# Patient Record
Sex: MALE | Race: WHITE | NOT HISPANIC OR LATINO | Employment: FULL TIME | URBAN - METROPOLITAN AREA
[De-identification: names, ages, dates, MRNs, and addresses within clinical notes are randomized per-mention and may not be internally consistent; named-entity substitution may affect disease eponyms.]

---

## 2020-11-14 ENCOUNTER — HOSPITAL ENCOUNTER (INPATIENT)
Facility: CLINIC | Age: 20
LOS: 2 days | Discharge: HOME | DRG: 385 | End: 2020-11-16
Attending: EMERGENCY MEDICINE | Admitting: INTERNAL MEDICINE
Payer: COMMERCIAL

## 2020-11-14 ENCOUNTER — APPOINTMENT (OUTPATIENT)
Dept: CT IMAGING | Facility: CLINIC | Age: 20
DRG: 385 | End: 2020-11-14
Attending: EMERGENCY MEDICINE
Payer: COMMERCIAL

## 2020-11-14 DIAGNOSIS — D50.9 IRON DEFICIENCY ANEMIA, UNSPECIFIED IRON DEFICIENCY ANEMIA TYPE: ICD-10-CM

## 2020-11-14 DIAGNOSIS — K52.9 ILEOCOLITIS: ICD-10-CM

## 2020-11-14 DIAGNOSIS — K50.814 CROHN'S DISEASE OF BOTH SMALL AND LARGE INTESTINE WITH ABSCESS (H): Primary | ICD-10-CM

## 2020-11-14 LAB
ALBUMIN SERPL-MCNC: 2.8 G/DL (ref 3.4–5)
ALBUMIN UR-MCNC: 20 MG/DL
ALP SERPL-CCNC: 68 U/L (ref 40–150)
ALT SERPL W P-5'-P-CCNC: 14 U/L (ref 0–70)
ANION GAP SERPL CALCULATED.3IONS-SCNC: 5 MMOL/L (ref 3–14)
APPEARANCE UR: CLEAR
AST SERPL W P-5'-P-CCNC: 12 U/L (ref 0–45)
BASOPHILS # BLD AUTO: 0 10E9/L (ref 0–0.2)
BASOPHILS NFR BLD AUTO: 0.2 %
BILIRUB SERPL-MCNC: 0.4 MG/DL (ref 0.2–1.3)
BILIRUB UR QL STRIP: NEGATIVE
BUN SERPL-MCNC: 11 MG/DL (ref 7–30)
CALCIUM SERPL-MCNC: 8.4 MG/DL (ref 8.5–10.1)
CHLORIDE SERPL-SCNC: 108 MMOL/L (ref 94–109)
CO2 SERPL-SCNC: 26 MMOL/L (ref 20–32)
COLOR UR AUTO: YELLOW
CREAT SERPL-MCNC: 1.12 MG/DL (ref 0.66–1.25)
CRP SERPL-MCNC: 120 MG/L (ref 0–8)
DIFFERENTIAL METHOD BLD: ABNORMAL
EOSINOPHIL # BLD AUTO: 0.1 10E9/L (ref 0–0.7)
EOSINOPHIL NFR BLD AUTO: 1 %
ERYTHROCYTE [DISTWIDTH] IN BLOOD BY AUTOMATED COUNT: 15.4 % (ref 10–15)
ERYTHROCYTE [SEDIMENTATION RATE] IN BLOOD BY WESTERGREN METHOD: 42 MM/H (ref 0–15)
FERRITIN SERPL-MCNC: 42 NG/ML (ref 26–388)
GFR SERPL CREATININE-BSD FRML MDRD: >90 ML/MIN/{1.73_M2}
GLUCOSE SERPL-MCNC: 79 MG/DL (ref 70–99)
GLUCOSE UR STRIP-MCNC: NEGATIVE MG/DL
HCT VFR BLD AUTO: 35.4 % (ref 40–53)
HGB BLD-MCNC: 10.2 G/DL (ref 13.3–17.7)
HGB UR QL STRIP: NEGATIVE
IMM GRANULOCYTES # BLD: 0 10E9/L (ref 0–0.4)
IMM GRANULOCYTES NFR BLD: 0.3 %
IRON SATN MFR SERPL: 2 % (ref 15–46)
IRON SERPL-MCNC: 6 UG/DL (ref 35–180)
KETONES UR STRIP-MCNC: NEGATIVE MG/DL
LABORATORY COMMENT REPORT: NORMAL
LACTATE BLD-SCNC: 0.6 MMOL/L (ref 0.7–2)
LEUKOCYTE ESTERASE UR QL STRIP: NEGATIVE
LIPASE SERPL-CCNC: 137 U/L (ref 73–393)
LYMPHOCYTES # BLD AUTO: 1 10E9/L (ref 0.8–5.3)
LYMPHOCYTES NFR BLD AUTO: 7.9 %
MCH RBC QN AUTO: 21.1 PG (ref 26.5–33)
MCHC RBC AUTO-ENTMCNC: 28.8 G/DL (ref 31.5–36.5)
MCV RBC AUTO: 73 FL (ref 78–100)
MONOCYTES # BLD AUTO: 0.9 10E9/L (ref 0–1.3)
MONOCYTES NFR BLD AUTO: 7.5 %
MUCOUS THREADS #/AREA URNS LPF: PRESENT /LPF
NEUTROPHILS # BLD AUTO: 10.4 10E9/L (ref 1.6–8.3)
NEUTROPHILS NFR BLD AUTO: 83.1 %
NITRATE UR QL: NEGATIVE
NRBC # BLD AUTO: 0 10*3/UL
NRBC BLD AUTO-RTO: 0 /100
PH UR STRIP: 6 PH (ref 5–7)
PLATELET # BLD AUTO: 554 10E9/L (ref 150–450)
POTASSIUM SERPL-SCNC: 3.8 MMOL/L (ref 3.4–5.3)
PROCALCITONIN SERPL-MCNC: <0.05 NG/ML
PROT SERPL-MCNC: 7.5 G/DL (ref 6.8–8.8)
RBC # BLD AUTO: 4.83 10E12/L (ref 4.4–5.9)
RBC #/AREA URNS AUTO: <1 /HPF (ref 0–2)
SARS-COV-2 RNA SPEC QL NAA+PROBE: NEGATIVE
SARS-COV-2 RNA SPEC QL NAA+PROBE: NORMAL
SODIUM SERPL-SCNC: 139 MMOL/L (ref 133–144)
SOURCE: ABNORMAL
SP GR UR STRIP: 1.03 (ref 1–1.03)
SPECIMEN SOURCE: NORMAL
SPECIMEN SOURCE: NORMAL
TIBC SERPL-MCNC: 275 UG/DL (ref 240–430)
UROBILINOGEN UR STRIP-MCNC: NORMAL MG/DL (ref 0–2)
WBC # BLD AUTO: 12.5 10E9/L (ref 4–11)
WBC #/AREA URNS AUTO: 1 /HPF (ref 0–5)

## 2020-11-14 PROCEDURE — 82728 ASSAY OF FERRITIN: CPT | Performed by: INTERNAL MEDICINE

## 2020-11-14 PROCEDURE — 96375 TX/PRO/DX INJ NEW DRUG ADDON: CPT

## 2020-11-14 PROCEDURE — 96365 THER/PROPH/DIAG IV INF INIT: CPT | Mod: 59

## 2020-11-14 PROCEDURE — 250N000011 HC RX IP 250 OP 636: Performed by: EMERGENCY MEDICINE

## 2020-11-14 PROCEDURE — 83605 ASSAY OF LACTIC ACID: CPT | Performed by: EMERGENCY MEDICINE

## 2020-11-14 PROCEDURE — 87340 HEPATITIS B SURFACE AG IA: CPT | Performed by: INTERNAL MEDICINE

## 2020-11-14 PROCEDURE — 86481 TB AG RESPONSE T-CELL SUSP: CPT | Performed by: INTERNAL MEDICINE

## 2020-11-14 PROCEDURE — 99223 1ST HOSP IP/OBS HIGH 75: CPT | Mod: AI | Performed by: INTERNAL MEDICINE

## 2020-11-14 PROCEDURE — 120N000004 HC R&B MS OVERFLOW

## 2020-11-14 PROCEDURE — 83540 ASSAY OF IRON: CPT | Performed by: EMERGENCY MEDICINE

## 2020-11-14 PROCEDURE — 36415 COLL VENOUS BLD VENIPUNCTURE: CPT | Performed by: EMERGENCY MEDICINE

## 2020-11-14 PROCEDURE — 81001 URINALYSIS AUTO W/SCOPE: CPT | Performed by: EMERGENCY MEDICINE

## 2020-11-14 PROCEDURE — 83550 IRON BINDING TEST: CPT | Performed by: EMERGENCY MEDICINE

## 2020-11-14 PROCEDURE — 258N000003 HC RX IP 258 OP 636: Performed by: INTERNAL MEDICINE

## 2020-11-14 PROCEDURE — C9803 HOPD COVID-19 SPEC COLLECT: HCPCS

## 2020-11-14 PROCEDURE — 74177 CT ABD & PELVIS W/CONTRAST: CPT

## 2020-11-14 PROCEDURE — 87506 IADNA-DNA/RNA PROBE TQ 6-11: CPT | Performed by: INTERNAL MEDICINE

## 2020-11-14 PROCEDURE — 85652 RBC SED RATE AUTOMATED: CPT | Performed by: EMERGENCY MEDICINE

## 2020-11-14 PROCEDURE — 85025 COMPLETE CBC W/AUTO DIFF WBC: CPT | Performed by: EMERGENCY MEDICINE

## 2020-11-14 PROCEDURE — 258N000003 HC RX IP 258 OP 636: Performed by: EMERGENCY MEDICINE

## 2020-11-14 PROCEDURE — 80053 COMPREHEN METABOLIC PANEL: CPT | Performed by: EMERGENCY MEDICINE

## 2020-11-14 PROCEDURE — 86140 C-REACTIVE PROTEIN: CPT | Performed by: EMERGENCY MEDICINE

## 2020-11-14 PROCEDURE — 99285 EMERGENCY DEPT VISIT HI MDM: CPT | Mod: 25

## 2020-11-14 PROCEDURE — U0003 INFECTIOUS AGENT DETECTION BY NUCLEIC ACID (DNA OR RNA); SEVERE ACUTE RESPIRATORY SYNDROME CORONAVIRUS 2 (SARS-COV-2) (CORONAVIRUS DISEASE [COVID-19]), AMPLIFIED PROBE TECHNIQUE, MAKING USE OF HIGH THROUGHPUT TECHNOLOGIES AS DESCRIBED BY CMS-2020-01-R: HCPCS | Performed by: EMERGENCY MEDICINE

## 2020-11-14 PROCEDURE — 87040 BLOOD CULTURE FOR BACTERIA: CPT | Performed by: EMERGENCY MEDICINE

## 2020-11-14 PROCEDURE — 84145 PROCALCITONIN (PCT): CPT | Performed by: EMERGENCY MEDICINE

## 2020-11-14 PROCEDURE — 83690 ASSAY OF LIPASE: CPT | Performed by: EMERGENCY MEDICINE

## 2020-11-14 PROCEDURE — 250N000011 HC RX IP 250 OP 636: Performed by: INTERNAL MEDICINE

## 2020-11-14 PROCEDURE — 96361 HYDRATE IV INFUSION ADD-ON: CPT

## 2020-11-14 RX ORDER — KETOROLAC TROMETHAMINE 15 MG/ML
15 INJECTION, SOLUTION INTRAMUSCULAR; INTRAVENOUS EVERY 6 HOURS PRN
Status: DISCONTINUED | OUTPATIENT
Start: 2020-11-14 | End: 2020-11-14

## 2020-11-14 RX ORDER — IOPAMIDOL 755 MG/ML
68 INJECTION, SOLUTION INTRAVASCULAR ONCE
Status: COMPLETED | OUTPATIENT
Start: 2020-11-14 | End: 2020-11-14

## 2020-11-14 RX ORDER — ACETAMINOPHEN 325 MG/1
650 TABLET ORAL EVERY 4 HOURS PRN
Status: DISCONTINUED | OUTPATIENT
Start: 2020-11-14 | End: 2020-11-16 | Stop reason: HOSPADM

## 2020-11-14 RX ORDER — HYDROMORPHONE HYDROCHLORIDE 1 MG/ML
0.3 INJECTION, SOLUTION INTRAMUSCULAR; INTRAVENOUS; SUBCUTANEOUS
Status: DISCONTINUED | OUTPATIENT
Start: 2020-11-14 | End: 2020-11-16 | Stop reason: HOSPADM

## 2020-11-14 RX ORDER — SODIUM CHLORIDE, SODIUM LACTATE, POTASSIUM CHLORIDE, CALCIUM CHLORIDE 600; 310; 30; 20 MG/100ML; MG/100ML; MG/100ML; MG/100ML
INJECTION, SOLUTION INTRAVENOUS CONTINUOUS
Status: DISCONTINUED | OUTPATIENT
Start: 2020-11-14 | End: 2020-11-15

## 2020-11-14 RX ORDER — LIDOCAINE 40 MG/G
CREAM TOPICAL
Status: DISCONTINUED | OUTPATIENT
Start: 2020-11-14 | End: 2020-11-16 | Stop reason: HOSPADM

## 2020-11-14 RX ORDER — NALOXONE HYDROCHLORIDE 0.4 MG/ML
.1-.4 INJECTION, SOLUTION INTRAMUSCULAR; INTRAVENOUS; SUBCUTANEOUS
Status: DISCONTINUED | OUTPATIENT
Start: 2020-11-14 | End: 2020-11-16 | Stop reason: HOSPADM

## 2020-11-14 RX ORDER — KETOROLAC TROMETHAMINE 15 MG/ML
10 INJECTION, SOLUTION INTRAMUSCULAR; INTRAVENOUS ONCE
Status: COMPLETED | OUTPATIENT
Start: 2020-11-14 | End: 2020-11-14

## 2020-11-14 RX ORDER — OXYCODONE HYDROCHLORIDE 5 MG/1
5 TABLET ORAL EVERY 4 HOURS PRN
Status: DISCONTINUED | OUTPATIENT
Start: 2020-11-14 | End: 2020-11-16 | Stop reason: HOSPADM

## 2020-11-14 RX ORDER — ONDANSETRON 2 MG/ML
4 INJECTION INTRAMUSCULAR; INTRAVENOUS EVERY 6 HOURS PRN
Status: DISCONTINUED | OUTPATIENT
Start: 2020-11-14 | End: 2020-11-16 | Stop reason: HOSPADM

## 2020-11-14 RX ORDER — METHYLPREDNISOLONE SODIUM SUCCINATE 125 MG/2ML
125 INJECTION, POWDER, LYOPHILIZED, FOR SOLUTION INTRAMUSCULAR; INTRAVENOUS ONCE
Status: COMPLETED | OUTPATIENT
Start: 2020-11-14 | End: 2020-11-14

## 2020-11-14 RX ORDER — ONDANSETRON 4 MG/1
4 TABLET, ORALLY DISINTEGRATING ORAL EVERY 6 HOURS PRN
Status: DISCONTINUED | OUTPATIENT
Start: 2020-11-14 | End: 2020-11-16 | Stop reason: HOSPADM

## 2020-11-14 RX ORDER — METHYLPREDNISOLONE SODIUM SUCCINATE 40 MG/ML
20 INJECTION, POWDER, LYOPHILIZED, FOR SOLUTION INTRAMUSCULAR; INTRAVENOUS EVERY 12 HOURS
Status: DISCONTINUED | OUTPATIENT
Start: 2020-11-14 | End: 2020-11-16 | Stop reason: HOSPADM

## 2020-11-14 RX ADMIN — IOPAMIDOL 68 ML: 755 INJECTION, SOLUTION INTRAVENOUS at 10:52

## 2020-11-14 RX ADMIN — TAZOBACTAM SODIUM AND PIPERACILLIN SODIUM 3.38 G: 375; 3 INJECTION, SOLUTION INTRAVENOUS at 20:20

## 2020-11-14 RX ADMIN — SODIUM CHLORIDE, POTASSIUM CHLORIDE, SODIUM LACTATE AND CALCIUM CHLORIDE 1000 ML: 600; 310; 30; 20 INJECTION, SOLUTION INTRAVENOUS at 10:36

## 2020-11-14 RX ADMIN — METHYLPREDNISOLONE SODIUM SUCCINATE 125 MG: 125 INJECTION, POWDER, FOR SOLUTION INTRAMUSCULAR; INTRAVENOUS at 13:27

## 2020-11-14 RX ADMIN — TAZOBACTAM SODIUM AND PIPERACILLIN SODIUM 4.5 G: 500; 4 INJECTION, SOLUTION INTRAVENOUS at 14:00

## 2020-11-14 RX ADMIN — SODIUM CHLORIDE, POTASSIUM CHLORIDE, SODIUM LACTATE AND CALCIUM CHLORIDE 1000 ML: 600; 310; 30; 20 INJECTION, SOLUTION INTRAVENOUS at 12:57

## 2020-11-14 RX ADMIN — METHYLPREDNISOLONE SODIUM SUCCINATE 20 MG: 40 INJECTION, POWDER, FOR SOLUTION INTRAMUSCULAR; INTRAVENOUS at 20:18

## 2020-11-14 RX ADMIN — SODIUM CHLORIDE, POTASSIUM CHLORIDE, SODIUM LACTATE AND CALCIUM CHLORIDE: 600; 310; 30; 20 INJECTION, SOLUTION INTRAVENOUS at 17:02

## 2020-11-14 RX ADMIN — KETOROLAC TROMETHAMINE 10 MG: 15 INJECTION, SOLUTION INTRAMUSCULAR; INTRAVENOUS at 10:36

## 2020-11-14 ASSESSMENT — ENCOUNTER SYMPTOMS
DYSURIA: 1
BLOOD IN STOOL: 1
VOMITING: 0
ABDOMINAL PAIN: 1
SORE THROAT: 0
FREQUENCY: 0
FEVER: 0

## 2020-11-14 ASSESSMENT — MIFFLIN-ST. JEOR
SCORE: 1634.49
SCORE: 1619.06

## 2020-11-14 NOTE — ED NOTES
[facility]  ED Nurse Handoff Report    Romina Dave is a 20 year old male   ED Chief complaint: Abdominal Pain  . ED Diagnosis:   Final diagnoses:   Ileocolitis   Iron deficiency anemia, unspecified iron deficiency anemia type     Allergies: No Known Allergies    Code Status: Full Code  Activity level - Baseline/Home:  Independent. Activity Level - Current:   Independent. Lift room needed: No. Bariatric: No   Needed: No   Isolation: No. Infection: Not Applicable.     Vital Signs:   Vitals:    11/14/20 1200 11/14/20 1215 11/14/20 1230 11/14/20 1245   BP: 113/70 109/68 110/66 113/65   Pulse: 99 95 98 106   Resp:       Temp:       TempSrc:       SpO2: 100% 100% 100% 100%   Weight:       Height:           Cardiac Rhythm:  ,      Pain level: 0-10 Pain Scale: 0  Patient confused: No. Patient Falls Risk: Yes.   Elimination Status: .   Patient Report - Initial Complaint: abdominal pain.     Focused Assessment: Romina Dave is a 20 year old otherwise healthy male with no history of abdominal surgeries who presents alone for evaluation of ongoing low bilateral abdominal pain for the last two months, worsening this morning at 0500. Patient reports he has been experiencing pain across his abdomen since September and notes the pain is typically alleviated when he passes gas, but exacerbated with passing stool. Patient reports this morning around 0500, he was laying down and had acute onset of pain. Patient reports that he went to go void and defecate as he had the urge to, but experienced dysuria and pain with defecation. Patient states the pain persisted longer than baseline, thus prompted his presentation.      Here, patient reports he has not been to a doctor's office in more than 8 years. Patient states that he has noticed some bright red blood in his stool, but states this happens whenever he strains and this resolved if he drinks lots of water. He has not taken any interventions for his pain,  "including Tylenol or ibuprofen. Patient denies any sore throat, fever, urinary frequency, vomiting, testicular pain or swelling. Of note, patient reports he recently \"discovered that he is lactose intolerant on my own.\"   Tests Performed: ct, labs  Abnormal Results:   Abd/pelvis CT,  IV  contrast only TRAUMA / AAA   Final Result   IMPRESSION:    1.  Marked wall thickening, mural hyperenhancement and inflammatory   changes involving about 25 cm of the distal and terminal ileum and   inflammatory changes involving the sigmoid colon most suspicious for   inflammatory bowel disease such as Crohn's ileocolitis.   2.  Likely small abscess in the pelvis between small bowel loops and   the sigmoid colon. This is less likely an abnormal collapsed bowel   loop and oral contrast could be administered for confirmation if   clinically desired. Percutaneous aspiration or catheter placement into   this abscess would be difficult due to presence of multiple adjacent   inflamed bowel loops.      SYLVIA JACOB MD        Labs Ordered and Resulted from Time of ED Arrival Up to the Time of Departure from the ED   ROUTINE UA WITH MICROSCOPIC - Abnormal; Notable for the following components:       Result Value    Protein Albumin Urine 20 (*)     Mucous Urine Present (*)     All other components within normal limits   CBC WITH PLATELETS DIFFERENTIAL - Abnormal; Notable for the following components:    WBC 12.5 (*)     Hemoglobin 10.2 (*)     Hematocrit 35.4 (*)     MCV 73 (*)     MCH 21.1 (*)     MCHC 28.8 (*)     RDW 15.4 (*)     Platelet Count 554 (*)     Absolute Neutrophil 10.4 (*)     All other components within normal limits   COMPREHENSIVE METABOLIC PANEL - Abnormal; Notable for the following components:    Calcium 8.4 (*)     Albumin 2.8 (*)     All other components within normal limits   IRON AND IRON BINDING CAPACITY - Abnormal; Notable for the following components:    Iron 6 (*)     Iron Saturation Index 2 (*)     All other " components within normal limits   CRP INFLAMMATION - Abnormal; Notable for the following components:    CRP Inflammation 120.0 (*)     All other components within normal limits   ERYTHROCYTE SEDIMENTATION RATE AUTO - Abnormal; Notable for the following components:    Sed Rate 42 (*)     All other components within normal limits   LACTIC ACID WHOLE BLOOD - Abnormal; Notable for the following components:    Lactic Acid 0.6 (*)     All other components within normal limits   LIPASE   PROCALCITONIN   COVID-19 VIRUS (CORONAVIRUS) BY PCR   BLOOD CULTURE   BLOOD CULTURE     Treatments provided: see mar  Family Comments: no visitors  OBS brochure/video discussed/provided to patient:  No  ED Medications:   Medications   lactated ringers BOLUS 1,000 mL (1,000 mLs Intravenous New Bag 11/14/20 1257)   ketorolac (TORADOL) injection 10 mg (10 mg Intravenous Given 11/14/20 1036)   lactated ringers BOLUS 1,000 mL (0 mLs Intravenous Stopped 11/14/20 1257)   iopamidol (ISOVUE-370) solution 68 mL (68 mLs Intravenous Given 11/14/20 1052)   sodium chloride (PF) 0.9% PF flush 57 mL (57 mLs Intravenous Given 11/14/20 1052)   methylPREDNISolone sodium succinate (solu-MEDROL) injection 125 mg (125 mg Intravenous Given 11/14/20 1327)     Drips infusing:  No  For the majority of the shift, the patient's behavior Green. Interventions performed were na.    Sepsis treatment initiated: No     Patient tested for COVID 19 prior to admission: YES    ED Nurse Name/Phone Number: Cindy Restrepo RN,   1:40 PM    RECEIVING UNIT ED HANDOFF REVIEW    Above ED Nurse Handoff Report was reviewed: Yes  Reviewed by: ORLANDO HERRERA RN on November 14, 2020 at 3:56 PM

## 2020-11-14 NOTE — CONSULTS
Consult Date:  11/14/2020      GASTROINTESTINAL CONSULTATION      REQUESTING PHYSICIAN:  Benjy Nieves M.D.      REASON FOR CONSULTATION:  We are asked to see Mr. Dave by Dr. Nieves for further evaluation of abnormal CT scan and abdominal pain.      HISTORY OF PRESENT ILLNESS:  The patient is a 20-year-old man who states he has been having some lower abdominal pain ongoing for about 2 months.  He describes pain with attempted urination or stooling.  Bowel movements are about once a day.  He does note intermittent rectal bleeding since around 2014, but has not had that worked up in the past.  He denies any fever or chills.  States there has been no change in his weight, although he is somewhat underweight, 5 feet 11, 135 pounds and despite working out he has been unable to gain.      MEDICATIONS:  He takes no medications.      FAMILY HISTORY:  Essentially unknown.      SOCIAL HISTORY:  Single, works as a .      REVIEW OF SYSTEMS:  He is a nonsmoker.  Did have some self-limited hematuria 2 years ago, apparently not worked up.  Remainder of 10-point review of systems is negative.      PHYSICAL EXAMINATION:   GENERAL:  Well-developed, well-nourished man in no acute distress.   VITAL SIGNS:  Temperature is 97.8, pulse of 98, blood pressure 115/70, respirations 18.   HEENT:  Pupils round, reactive to light.  Sclerae are anicteric.  Pharynx not examined due to mask placement.   NECK:  Supple, no adenopathy or thyromegaly.   COR:  Normal S1, S2.   CHEST:  Clear to percussion and auscultation.   ABDOMEN:  Notable for minimal left lower quadrant tenderness to deep palpation.  Bowel sounds are active.  There is no hepatomegaly, no splenomegaly.  No significant right lower quadrant tenderness.   EXTREMITIES:  Without cyanosis, clubbing or edema.   SKIN:  Unremarkable.  Hand  is 5/5 bilaterally.      LABORATORY DATA:  Electrolytes are normal.  Creatinine 1.12.  Liver function tests are normal.  He has a  CRP of 120.  Iron of 6, iron saturation of 2.  Lipase is normal.  White count of 12.5, hemoglobin 10.2, MCV is 73, platelets 554,000.  Blood cultures pending.  CT scan of the abdomen demonstrates approximately a 25 cm segment of distal and terminal ileum with marked circumferential wall thickening, mural enhancement, mild dilatation and inflammatory changes as well as some possible inflammatory change in the sigmoid colon.  In addition, there is a 4.2 x 1.8 cm fluid collection with punctate foci of gas, possibly an early abscess, not easily amenable to drainage.  Blood cultures are pending at this time.      ASSESSMENT:  Two-month history of symptoms which may in fact be a several-year history of symptoms associated with what appears to be fairly definite iron deficiency and abnormal CT scan all points towards a chronic inflammatory condition most likely Crohn's disease of the small bowel with or without some sigmoid colitis.  There may be a small abscess forming especially with the foci of air, but nothing that is easily drainable at this point in time.  C-reactive protein does suggest rather significant inflammation in the small bowel.  I would like to get him started on anti-inflammatories as soon as possible.      RECOMMENDATIONS:     1.  The patient already received an initial dose of methylprednisolone in the emergency room as well as Zosyn.   2.  Admit for observation.  Clear liquid diet.  Await blood cultures.  Observe for 24-48 hours.  If improving, may be able to continue oral antibiotics and oral steroids at home with more definitive evaluation at a later date.  Eventually would like to do a colonoscopy to confirm findings, but would wait at this point in time due to question of abscess.  Will need studies in the future including a TPMT level, hepatitis B screening and a tuberculosis screening as he may ultimately be a candidate for biologic therapy.         XENA SHOEMAKER MD             D:  2020   T: 2020   MT: JOSÉ MIGUEL      Name:     DANIEL DANIEL   MRN:      [id number]        Account:       [id number]   :      2000           Consult Date:  2020      Document: [id number]       cc: Benjy Nieves MD

## 2020-11-14 NOTE — LETTER
[facility] PEDIATR[facility]  [address]  [facility] [id number]  [phone] November 16, 2020    RE:  Romina Dave                                                                                                                                                       [address] [facility] [address]  [place] [address]            To whom it may concern:    Romina Dave was under my professional care in the hospital from 11/14/2020 through 11/16/2020.  Please excuse him from work during this time period.  Additionally, with the rapidly increasing cases of COVI19 he needs to be away from the general public and work to prevent exposure to this virus for at least the next 10 days, but potentially longer depending on recommendations from physicians he will follow up with in clinic.  Further recommendations on when he can return to work will come from his clinic physician.       Sincerely,        Zaid Marcos MD

## 2020-11-14 NOTE — ED PROVIDER NOTES
"  History     Chief Complaint:  Abdominal Pain    The history is provided by the patient.      Romina Dave is a 20 year old otherwise healthy male with no history of abdominal surgeries who presents alone for evaluation of ongoing low bilateral abdominal pain for the last two months, worsening this morning at 0500. Patient reports he has been experiencing pain across his abdomen since September and notes the pain is typically alleviated when he passes gas, but exacerbated with passing stool. Patient reports this morning around 0500, he was laying down and had acute onset of pain. Patient reports that he went to go void and defecate as he had the urge to, but experienced dysuria and pain with defecation. Patient states the pain persisted longer than baseline, thus prompted his presentation.     Here, patient reports he has not been to a doctor's office in more than 8 years. Patient states that he has noticed some bright red blood in his stool, but states this happens whenever he strains and this resolved if he drinks lots of water. He has not taken any interventions for his pain, including Tylenol or ibuprofen. Patient denies any sore throat, fever, urinary frequency, vomiting, testicular pain or swelling. Of note, patient reports he recently \"discovered that he is lactose intolerant on my own.\"     Allergies:  No Known Drug Allergies     Medications:    The patient is not currently taking any prescribed medications.     Past Medical History:    History reviewed. No pertinent past medical history.     Past Surgical History:    History reviewed. No pertinent past surgical history.    Family History:    History reviewed. No pertinent family history.       Social History:  The patient was unaccompanied to the ED.     Review of Systems   Constitutional: Negative for fever.   HENT: Negative for sore throat.    Gastrointestinal: Positive for abdominal pain and blood in stool. Negative for vomiting.   Genitourinary: " "Positive for dysuria. Negative for frequency, scrotal swelling and testicular pain.   All other systems reviewed and are negative.      Physical Exam     Patient Vitals for the past 24 hrs:   BP Temp Temp src Pulse Resp SpO2 Height Weight   11/14/20 1245 113/65 -- -- 106 -- 100 % -- --   11/14/20 1230 110/66 -- -- 98 -- 100 % -- --   11/14/20 1215 109/68 -- -- 95 -- 100 % -- --   11/14/20 1200 113/70 -- -- 99 -- 100 % -- --   11/14/20 1145 113/69 -- -- 102 -- 100 % -- --   11/14/20 1130 106/60 -- -- 98 -- 100 % -- --   11/14/20 0937 126/78 97.8  F (36.6  C) Temporal 105 18 98 % 1.803 m (5' 11\") 61.2 kg (135 lb)       Physical Exam    HEENT: mmm  Neck: supple  CV: ppi, regular   Resp: speaking in full sentences without any resp distress  Abd: mild tenderness in the periumbilical and lower abdomen area  Ext: peripheral edema present:  No  Skin: warm dry well perfused  Neuro: Alert, no gross motor or sensory deficits,  gait stable      Emergency Department Course   Imaging:  Radiology findings were communicated with the patient who voiced understanding of the findings.    Abd/Pelvis CT IV Contrast only:  IMPRESSION:   1.  Marked wall thickening, mural hyperenhancement and inflammatory   changes involving about 25 cm of the distal and terminal ileum and   inflammatory changes involving the sigmoid colon most suspicious for   inflammatory bowel disease such as Crohn's ileocolitis.   2.  Likely small abscess in the pelvis between small bowel loops and   the sigmoid colon. This is less likely an abnormal collapsed bowel   loop and oral contrast could be administered for confirmation if   clinically desired. Percutaneous aspiration or catheter placement into   this abscess would be difficult due to presence of multiple adjacent   inflamed bowel loops.   Reading per radiology.     Laboratory:  Laboratory findings were communicated with the patient who voiced understanding of the findings.    CBC: WBC 12.5 (H), HGB 10.2 (L), "  (H)   CMP: Calcium 8.4 (L), Albumin 2.8 (L) o/w WNL (Creatinine 1.12)  Lipase: 137  Iron and iron binding capacity: Iron 6 (L), Iron Binding Cap 275, Iron Saturation Index 2 (L)     Lactic Acid (Resulted 1306): 0.6 (L)   Procalcitonin: Pending  CRP Inflammation: 120.0 (H)  Erythrocyte Sedimentation Rate Auto: 42 (H)  Blood Cultures: Pending x2     UA with Microscopic: Protein Albumin Urine 20 (A), Mucous Urine Present (A) o/w WNL      Asymptomatic COVID-19 (Coronavirus) PCR by Nasopharyngeal Swab: Pending      Interventions:  1036 Lactated Ringers Bolus 1000 mL IV  1036 Toradol 10 mg IV  1257 Lactated Ringers Bolus 1000 mL IV  1327 Solu-medrol 125 mg IV  1400 Zosyn 4.5 g IV     Emergency Department Course:  Past medical records, nursing notes, and vitals reviewed.    (1000)   I performed an exam of the patient as documented above. History obtained from patient.    (1028)   IV was inserted and blood was drawn for laboratory testing, results above.    (1039)   The patient provided a urine sample here in the emergency department. This was sent for laboratory testing, findings above.    (1051)   The patient was sent for an Abd/Pelvis CT IV Contrast only while in the emergency department, results above.      (1248)   I rechecked the patient and discussed the results of his workup thus far. Discussed plan of care and recommended admission. Patient is agreeable to this.    (1313)   I spoke with Dr. Light of the Gastroenterology service regarding patient's presentation, findings, and plan of care.     (1333)   I spoke with Dr. Marcos of the Hospitalist service regarding patient's presentation, findings, and plan of care, who agrees to accept patient for further care, evaluation and monitoring.      Findings and plan explained to the Patient who consents to admission. Discussed the patient with Dr. Parkinson, who will admit the patient to an adult/med surg bed for further monitoring, evaluation, and treatment.    I  personally reviewed the laboratory and imaging results with the Patient and answered all related questions prior to admission.     Impression & Plan     Medical Decision Makin y M periumbilical pain and painful urination and defecation.  Mild ttp on exam in suprapubic and periumbilical space.  Will do labs, urine, CT abd/pel.  Ddx appy vs uti vs ibd vs sbo vs diverticulitis vs testicular torsion/epididymitis.     CARRILLO showing likely new dx crohns.  CT raises question of small abscess, clinically seems less likely given presenting sx time course, severity, etc. Discussed with GI and hospitalist service, start glucocorticoids, cover with abx until clinical picture more clear regarding abscess vs overlapping loops of bowel on CT.      Pt understands presumptive dx, and treatment plan.       Covid-19  Romina Dave was evaluated during a global COVID-19 pandemic, which necessitated consideration that the patient might be at risk for infection with the SARS-CoV-2 virus that causes COVID-19.   Applicable protocols for evaluation were followed during the patient's care.   COVID-19 was considered as part of the patient's evaluation. The plan for testing is:  a test was obtained during this visit.    Diagnosis:    ICD-10-CM    1. Ileocolitis  K52.9    2. Iron deficiency anemia, unspecified iron deficiency anemia type  D50.9        Disposition:  Admitted to Inpatient Adult Med/Surg.    Scribe Disclosure:  Carole ALBERT, am serving as a scribe at 10:01 AM on 2020 to document services personally performed by Benjy Nieves MD based on my observations and the provider's statements to me.   2020   [facility] EMERGENCY DEPT       Benjy Nieves MD  20

## 2020-11-14 NOTE — PHARMACY-ADMISSION MEDICATION HISTORY
Admission medication history interview status for this patient is complete. See [facility] admission navigator for allergy information, prior to admission medications and immunization status.     Medication history interview done via telephone during Covid-19 pandemic, indicate source(s): Patient  Medication history resources (including written lists, pill bottles, clinic record):None  Pharmacy: [facility] in [address]    Changes made to PTA medication list:  Added: Multivitamin  Deleted: None  Changed: None    Actions taken by pharmacist (provider contacted, etc):None     Additional medication history information:None    Medication reconciliation/reorder completed by provider prior to medication history?  No   (Y/N)     Prior to Admission medications    Medication Sig Last Dose Taking? Auth Provider   multivitamin CF FORMULA (CHOICEFUL) chewable tablet Take 1 tablet by mouth daily 11/14/2020 at Unknown time Yes Unknown, Entered By History

## 2020-11-14 NOTE — ED TRIAGE NOTES
"Pt c/o abdominal pain, just below the umbilicus and across the abdomen since Septmember. Pt states the pain is better when he passes gas, it is worse when he passes stool. Pt states \"I just discovered on my own that I am lactose intolerant\". Denies blood in stool. Denies nausea.   "

## 2020-11-14 NOTE — H&P
[facility]  Hospitalist Admission Note  Name: Romina Dave    MRN: [id number]  YOB: 2000    Age: 20 year old  Date of admission: 11/14/2020  Primary care provider: No Ref-Primary, Physician    Chief Complaint:  Abdominal pain    Assessment and Plan:   Suspect Crohn's ileocolitis, possible intra-abdominal abscess: 2 months of intermittent lower abdominal pain that is worse with passing gas and stooling associated with hematochezia.  CT the abdomen pelvis shows wall thickening and inflammatory changes of the distal and terminal ileum along with a portion of the sigmoid colon which is suggestive of Crohn's ileocolitis.  No history of IBD, but this would fit given 2 months of symptoms and some bright red blood with stools.  There is concern for possible small abscess in the pelvis, 4.2 x 1.8 cm, however this could be a loop of bowel and not actually an abscess.  He is tachycardic, has elevated CRP ESR and leukocytosis so will cover initially with empiric antibiotics.  Also starting IV steroids for likely Crohn's disease.  -[facility] consulted and likely DC later today  -Received 125 mg IV Solu-Medrol in the ER, continue with 20 mg IV Solu-Medrol every 12 hours until seen by GI.  Likely eventual transition to budesonide p.o.  -Empiric Zosyn given tachycardia, LR inflammatory markers, leukocytosis and possible abscess.  Also, ask for colorectal surgery to consult and get their opinion regarding the images.  IR unlikely ability to this area easily.  -Acetaminophen, IV Dilaudid, oxycodone, IV Toradol as needed  -Clear liquid diet initially, would not advance until tomorrow  -LR at 100 ml/hr    Iron deficiency anemia: Globin 10.2.  MCV is 73.  Iron level of 6, iron saturation index 2.  Reports hematochezia for years.  Most likely iron deficiency anemia.  -Add on ferritin level  -Oral iron on discharge    DVT Prophylaxis: Pneumatic Compression Devices  Code Status: Full Code  FEN: Clear liquid diet  only, LR at 100 ml/hr  Discharge Dispo: Home.  He does not think he has any insurance, but may be covered by his mother's insurance.  Consult social work/financial counselor  Estimated Disch Date / # of Days until Disch: Admit inpatient.  Anticipate minimal 2-3 night hospitalization.      History of Present Illness:  Romina Dave is a 20 year old male with  no significant past medical history who presents with abdominal pain.  For the past 2 months or so he has had intermittent lower abdominal pain that is worse with straining to have a bowel movement or passing gas.  At other times he does not have any pain.  The pain became more severe this morning around 5 AM so he came in for evaluation.  He notes occasional diarrhea, but also has formed stools.  When he does not drink enough water the stools also have some bright red blood with them.  On reflection he does report some bright red blood in his stools for over 5 years.  Other than the past 2 months he has never had pain like this before.  He denies any nausea, vomiting, fevers, chills.  Has not noticed any rash or joint pains.  He is not aware of any family history of Crohn's disease or ulcerative colitis.  He does not smoke and rarely uses alcohol.  Denies any cough, shortness of breath, or exposure to anyone with COVID-19.  He denies any sick contacts with vomiting or diarrhea.  He does not take any medications or see a doctor regularly.    History obtained from patient, medical record, and from Dr. Nieves in the emergency department.  Blood pressure 110-120 systolic, tachycardic to the 110s, temperature 97.8  F, oxygen high percent on room air.  He is moderately tender in the suprapubic area but otherwise looks relatively stable.  He does have leukocytosis to 12.5, anemia 10.2 with MCV of 73, platelet count 554.  His CRP is elevated at 120 and ESR is 42.   CMP within normal limit except for low albumin at 2.8.  Lactic acid not elevated 0.6.  2 blood cultures  "were obtained.  CT the abdomen pelvis shows inflammation in the terminal ileum and sigmoid colon along with a possible 4.2 x 1.8 cm fluid collection which could be an abscess.  He received 2 L of LR, 15 mg of IV Toradol, 125 mg IV Solu-Medrol, and IV Zosyn.  [facility] notified from the ER.  Admit inpatient.     Past Medical History reviewed:  No significant past medical history    Past Surgical History reviewed:  No previous surgeries    Social History reviewed:  Non-smoker  Rare alcohol use    Family History reviewed:  He is not aware of any family history of ulcerative colitis or Crohn's disease    Allergies:  No Known Allergies  Medications:  He does not take any medications    Review of Systems:  A Comprehensive greater than 10 system review of systems was carried out.  Pertinent positives and negatives are noted above.  Otherwise negative.     Physical Exam:  Blood pressure 113/65, pulse 106, temperature 97.8  F (36.6  C), temperature source Temporal, resp. rate 18, height 1.803 m (5' 11\"), weight 61.2 kg (135 lb), SpO2 100 %.  Wt Readings from Last 1 Encounters:   11/14/20 61.2 kg (135 lb)     Exam:  Constitutional: Awake, NAD   Eyes: sclera white  HEENT: atraumatic, MMM  Respiratory: no respiratory distress, lungs cta bilaterally, no crackles or wheeze  Cardiovascular: Regular tachycardia without murmur  GI:  thin, soft, focal tenderness in the suprapubic area to palpation without guarding, bowel sounds present  Skin: no rash or lesions, acyanotic  Musculoskeletal/extremities: atraumatic, no major deformities. No edema  Neurologic: A&O, speech clear, moves extremities equally  Psychiatric: calm, cooperative, normal affect    Lab and imaging data personally reviewed:  Labs:  Recent Labs   Lab 11/14/20  1046   WBC 12.5*   HGB 10.2*   HCT 35.4*   MCV 73*   *     Recent Labs   Lab 11/14/20  1046      POTASSIUM 3.8   CHLORIDE 108   CO2 26   ANIONGAP 5   GLC 79   BUN 11   CR 1.12   GFRESTIMATED >90 "   GFRESTBLACK >90   LUCIANO 8.4*   PROTTOTAL 7.5   ALBUMIN 2.8*   BILITOTAL 0.4   ALKPHOS 68   AST 12   ALT 14     Recent Labs   Lab 11/14/20  1046   SED 42*   .0*     Recent Labs   Lab 11/14/20  1257   LACT 0.6*         Imaging:  Recent Results (from the past 24 hour(s))   Abd/pelvis CT,  IV  contrast only TRAUMA / AAA    Narrative    CT ABDOMEN PELVIS W CONTRAST 11/14/2020 11:19 AM    CLINICAL HISTORY: periumbilical pain    TECHNIQUE: CT scan of the abdomen and pelvis was performed following  injection of IV contrast. Multiplanar reformats were obtained. Dose  reduction techniques were used.  CONTRAST: 68mL Isovue-370    COMPARISON: None.    FINDINGS:   LOWER CHEST: The visualized lung bases are clear.    HEPATOBILIARY: No significant mass or bile duct dilatation. No  calcified gallstones.     PANCREAS: Normal.    SPLEEN: Normal.    ADRENAL GLANDS: Normal.    KIDNEYS/BLADDER: Small amount of excreted contrast in the renal  collecting system and ureters. No renal masses, or hydronephrosis. No  definite renal calculi.    BOWEL: Approximately 25 cm length of the distal and terminal ileum  demonstrate marked circumferential wall thickening, mural  hyperenhancement, mild dilatation and surrounding inflammatory changes  with prominence of the vasa rectae. About 6 cm long segment of the  sigmoid colon also demonstrates circumferential wall thickening.  Findings are most concerning for inflammatory bowel disease such as  Crohn's ileocolitis. Between loops of small bowel and the sigmoid  colon, there is a 4.2 x 1.8 cm fluid collection (series 3, image 158)  with punctate foci of gas within it. This probably represents an  abscess although given the multiple inflamed bowel loops this could  represent an abnormal bowel loop. No small bowel obstruction or  colonic obstruction. Trace free fluid in the right paracolic gutter.  The appendix is normal.    PELVIC ORGANS: No pelvic masses.    ADDITIONAL FINDINGS: Major  intra-abdominal vasculature is patent.  Mildly enlarged mesenteric lymph nodes are likely reactive.    MUSCULOSKELETAL: Normal.      Impression    IMPRESSION:   1.  Marked wall thickening, mural hyperenhancement and inflammatory  changes involving about 25 cm of the distal and terminal ileum and  inflammatory changes involving the sigmoid colon most suspicious for  inflammatory bowel disease such as Crohn's ileocolitis.  2.  Likely small abscess in the pelvis between small bowel loops and  the sigmoid colon. This is less likely an abnormal collapsed bowel  loop and oral contrast could be administered for confirmation if  clinically desired. Percutaneous aspiration or catheter placement into  this abscess would be difficult due to presence of multiple adjacent  inflamed bowel loops.    MD Zaid RECINOS MD  Hospitalist  [facility]

## 2020-11-15 LAB
ANION GAP SERPL CALCULATED.3IONS-SCNC: 5 MMOL/L (ref 3–14)
BUN SERPL-MCNC: 10 MG/DL (ref 7–30)
C COLI+JEJUNI+LARI FUSA STL QL NAA+PROBE: NOT DETECTED
CALCIUM SERPL-MCNC: 8.3 MG/DL (ref 8.5–10.1)
CHLORIDE SERPL-SCNC: 109 MMOL/L (ref 94–109)
CO2 SERPL-SCNC: 27 MMOL/L (ref 20–32)
CREAT SERPL-MCNC: 1.03 MG/DL (ref 0.66–1.25)
EC STX1 GENE STL QL NAA+PROBE: NOT DETECTED
EC STX2 GENE STL QL NAA+PROBE: NOT DETECTED
ENTERIC PATHOGEN COMMENT: NORMAL
ERYTHROCYTE [DISTWIDTH] IN BLOOD BY AUTOMATED COUNT: 15.5 % (ref 10–15)
GFR SERPL CREATININE-BSD FRML MDRD: >90 ML/MIN/{1.73_M2}
GLUCOSE SERPL-MCNC: 138 MG/DL (ref 70–99)
HCT VFR BLD AUTO: 33.7 % (ref 40–53)
HGB BLD-MCNC: 9.6 G/DL (ref 13.3–17.7)
MCH RBC QN AUTO: 20.9 PG (ref 26.5–33)
MCHC RBC AUTO-ENTMCNC: 28.5 G/DL (ref 31.5–36.5)
MCV RBC AUTO: 73 FL (ref 78–100)
NOROV GI+II ORF1-ORF2 JNC STL QL NAA+PR: NOT DETECTED
PLATELET # BLD AUTO: 537 10E9/L (ref 150–450)
POTASSIUM SERPL-SCNC: 4.1 MMOL/L (ref 3.4–5.3)
RBC # BLD AUTO: 4.59 10E12/L (ref 4.4–5.9)
RVA NSP5 STL QL NAA+PROBE: NOT DETECTED
SALMONELLA SP RPOD STL QL NAA+PROBE: NOT DETECTED
SHIGELLA SP+EIEC IPAH STL QL NAA+PROBE: NOT DETECTED
SODIUM SERPL-SCNC: 141 MMOL/L (ref 133–144)
V CHOL+PARA RFBL+TRKH+TNAA STL QL NAA+PR: NOT DETECTED
WBC # BLD AUTO: 10.6 10E9/L (ref 4–11)
Y ENTERO RECN STL QL NAA+PROBE: NOT DETECTED

## 2020-11-15 PROCEDURE — 120N000004 HC R&B MS OVERFLOW

## 2020-11-15 PROCEDURE — 250N000011 HC RX IP 250 OP 636: Performed by: INTERNAL MEDICINE

## 2020-11-15 PROCEDURE — 85027 COMPLETE CBC AUTOMATED: CPT | Performed by: INTERNAL MEDICINE

## 2020-11-15 PROCEDURE — 82657 ENZYME CELL ACTIVITY: CPT | Performed by: INTERNAL MEDICINE

## 2020-11-15 PROCEDURE — 36415 COLL VENOUS BLD VENIPUNCTURE: CPT | Performed by: INTERNAL MEDICINE

## 2020-11-15 PROCEDURE — 80048 BASIC METABOLIC PNL TOTAL CA: CPT | Performed by: INTERNAL MEDICINE

## 2020-11-15 PROCEDURE — 258N000003 HC RX IP 258 OP 636: Performed by: INTERNAL MEDICINE

## 2020-11-15 PROCEDURE — 99233 SBSQ HOSP IP/OBS HIGH 50: CPT | Performed by: INTERNAL MEDICINE

## 2020-11-15 RX ADMIN — TAZOBACTAM SODIUM AND PIPERACILLIN SODIUM 3.38 G: 375; 3 INJECTION, SOLUTION INTRAVENOUS at 02:03

## 2020-11-15 RX ADMIN — METHYLPREDNISOLONE SODIUM SUCCINATE 20 MG: 40 INJECTION, POWDER, FOR SOLUTION INTRAMUSCULAR; INTRAVENOUS at 08:12

## 2020-11-15 RX ADMIN — METHYLPREDNISOLONE SODIUM SUCCINATE 20 MG: 40 INJECTION, POWDER, FOR SOLUTION INTRAMUSCULAR; INTRAVENOUS at 20:22

## 2020-11-15 RX ADMIN — TAZOBACTAM SODIUM AND PIPERACILLIN SODIUM 3.38 G: 375; 3 INJECTION, SOLUTION INTRAVENOUS at 08:12

## 2020-11-15 RX ADMIN — TAZOBACTAM SODIUM AND PIPERACILLIN SODIUM 3.38 G: 375; 3 INJECTION, SOLUTION INTRAVENOUS at 20:22

## 2020-11-15 RX ADMIN — TAZOBACTAM SODIUM AND PIPERACILLIN SODIUM 3.38 G: 375; 3 INJECTION, SOLUTION INTRAVENOUS at 15:31

## 2020-11-15 RX ADMIN — SODIUM CHLORIDE, POTASSIUM CHLORIDE, SODIUM LACTATE AND CALCIUM CHLORIDE: 600; 310; 30; 20 INJECTION, SOLUTION INTRAVENOUS at 04:02

## 2020-11-15 NOTE — PLAN OF CARE
"Vital Signs: VSS  Pain/Comfort: denies pain  Assessment: WDL   Diet: advanced to low fiber, tolerating well. Had one episode of \"feeling full\" after eating pancakes. Has resolved   Output: voiding, passing gas and stool  Activity/Ambulation: up ad fide  Social: fiance present and supportive  Plan: Care coordinator assisting with finding a PCP. Will continue IV abx and IV steroids as ordered. Will continue to monitor and provide supportive cares as needed    "

## 2020-11-15 NOTE — PROGRESS NOTES
"Colon & Rectal Surgery Progress Note             Interval History:   Hospital Day #: 2    Doing well.  Denies pain, n/v.  Tolerating clears.  Leukocytosis resolved.                Medications:   I have reviewed this patient's current medications               Physical Exam:   Blood pressure 104/67, pulse 70, temperature 97.9  F (36.6  C), temperature source Oral, resp. rate 18, height 1.803 m (5' 11\"), weight 59.7 kg (131 lb 9.6 oz), SpO2 99 %.    Intake/Output Summary (Last 24 hours) at 11/15/2020 1031  Last data filed at 11/15/2020 0654  Gross per 24 hour   Intake 1359 ml   Output 150 ml   Net 1209 ml     GEN:  Alert, NAD  ABD:  Soft, NT, ND, no rebound or guarding         Data:        Lab Results   Component Value Date     11/15/2020    Lab Results   Component Value Date    CHLORIDE 109 11/15/2020    Lab Results   Component Value Date    BUN 10 11/15/2020      Lab Results   Component Value Date    POTASSIUM 4.1 11/15/2020    Lab Results   Component Value Date    CO2 27 11/15/2020    Lab Results   Component Value Date    CR 1.03 11/15/2020    CR 1.12 11/14/2020        Lab Results   Component Value Date    HGB 9.6 (L) 11/15/2020    HGB 10.2 (L) 11/14/2020     Lab Results   Component Value Date     (H) 11/15/2020     (H) 11/14/2020     Lab Results   Component Value Date    WBC 10.6 11/15/2020    WBC 12.5 (H) 11/14/2020            Assessment and Plan:   20 year old male with abdominal pain, rectal bleeding, anemia and CT scan with TI and sigmoid inflammation and likely pelvic abscess, suspicious for Crohn's.  Responding well to medical management. Discussed with Dr. Light plan to continue ABx and steroids.  If clinically worsening would repeat imaging and may need surgery during this hospitalization.  Otherwise would anticipate if continues to clinically improve, additional work-up could be completed as outpatient.  CRS will continue to follow patient during this hospitalization.     Kassidy LAWRENCE" MD Celsa  Colorectal Surgery     [facility]  [address]  [place] [address]  T: [phone]  F: [phone]

## 2020-11-15 NOTE — PROGRESS NOTES
"[facility]  Gastroenterology Progress note      Assessment       Stable overnight.  Less abdominal discomfort, WBC decreased to 10K.  Appreciate colorectal consult      Plan     continue current mgmt.   Possible change to PO meds tomorrow   I would keep him on prednisone, too much inflammation for budesonide.  Possible discharge in next 1-2 days with follow up ct scan in 10-14 days      Interval History     doing better      Physical Exam    /64 (BP Location: Left arm)   Pulse 62   Temp 97.5  F (36.4  C) (Oral)   Resp 18   Ht 1.803 m (5' 11\")   Wt 59.7 kg (131 lb 9.6 oz)   SpO2 100%   BMI 18.35 kg/m    Temp (24hrs), Av.7  F (36.5  C), Min:97.5  F (36.4  C), Max:97.9  F (36.6  C)    Patient Vitals for the past 72 hrs:   Weight   20 1651 59.7 kg (131 lb 9.6 oz)   20 0937 61.2 kg (135 lb)       Intake/Output Summary (Last 24 hours) at 11/15/2020 1411  Last data filed at 11/15/2020 1406  Gross per 24 hour   Intake 2679 ml   Output 150 ml   Net 2529 ml         Constitutional: NAD, comfortable  Cardiovascular: RRR, normal S1, S2   Respiratory: CTAB  Abdomen: soft, non-tender, nondistended,  bs+      Additional Comments     ROS, FH, SH: See initial GI consult for details.    Lab Data     Recent Labs   Lab Test 11/15/20  0709 20  1046   WBC 10.6 12.5*   HGB 9.6* 10.2*   MCV 73* 73*   * 554*     Recent Labs   Lab Test 11/15/20  0709 20  1046    139   POTASSIUM 4.1 3.8   CHLORIDE 109 108   CO2 27 26   BUN 10 11   CR 1.03 1.12   ANIONGAP 5 5   LUCIANO 8.3* 8.4*     Recent Labs   Lab Test 20  1046 20  1039   ALBUMIN 2.8*  --    BILITOTAL 0.4  --    ALT 14  --    AST 12  --    ALKPHOS 68  --    PROTEIN  --  20*   LIPASE 137  --                S. Anurag Light MD  [facility]  Office: [phone] call if needed after 5PM or on weekends  Cell: [phone], not available after 5PM at this number  "

## 2020-11-15 NOTE — PLAN OF CARE
Vital signs: Stable; afebrile.   Pain Control: Denies abdominal pain; Reports of abdominal fullness.   Diet: Clear liquids; tolerating well  Output: Voiding; BM x 1.                 Activity: Independent; up ad fide   Updates: Initiated continuous IV fluids started.   Plan: Continue IV antibiotics and IV steroids. Labs in AM.    Enteric precautions pending results for stool C. Diff test.     Will continue to monitor and provide for cares.

## 2020-11-15 NOTE — CONSULTS
Care Management Initial Consult    General Information  Assessment completed with: Patient,    Type of CM/SW Visit: Initial Assessment  Primary Care Provider verified and updated as needed:     Readmission within the last 30 days:        Reason for Consult: care coordination/care conference;discharge planning  Advance Care Planning: Advance Care Planning Reviewed: no concerns identified          Communication Assessment  Patient's communication style: spoken language (English or Bilingual)    Hearing Difficulty or Deaf: no   Wear Glasses or Blind: no    Cognitive  Cognitive/Neuro/Behavioral: WDL                      Living Environment:   People in home: significant other     Current living Arrangements: apartment      Able to return to prior arrangements: yes       Family/Social Support:  Care provided by: self  Provides care for: no one  Marital Status: Lives with Significant Other  Significant Other          Description of Support System: Supportive;Involved         Current Resources:   Skilled Home Care Services:  none  Community Resources:   none  Equipment currently used at home: none  Supplies currently used at home:  none    Employment/Financial:  Employment Status: employed full-time        Financial Concerns: insurance, none   Referral to Financial Counselor: Yes       Lifestyle & Psychosocial Needs:        Socioeconomic History     Marital status: Single     Spouse name: Not on file     Number of children: Not on file     Years of education: Not on file     Highest education level: Not on file          Functional Status:  Prior to admission patient needed assistance:    Patient independently living.        Additional Information:  CTS met with patient.  He is unclear if he has medical insurance.  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  I instructed him to call union first thing Monday to get insurance information.  CTS did call  and notify of  possible need for assistance with MA application.  Patient will need PCP at discharge.  Awaiting insurance information before trying to establish care.    Lilliam Deleon RN, BSN, PHN, CTS  Care Coordinator  [facility]  [phone]

## 2020-11-15 NOTE — CONSULTS
[facility]  Colon and Rectal Surgery Consult Note  Name: oRmina Dave    MRN: [id number]  YOB: 2000    Age: 20 year old  Date of admission: 11/14/2020  Primary care provider: No Ref-Primary, Physician       Reason for consult:  Pelvic fluid collection, new diagnosis of Crohn's           History of Present Illness:   Romina Dave is a 20 year old male, who presents with two months of crampy lower abdominal pain.    He is an otherwise healthy man, who reports a two month history of periumbilical pain that would come and go without obvious trigger. If he were to pass gas or have a bowel movement, this pain would spread to his bilateral flank. He also endorses several years of blood per rectum, both on the stool and on the toilet paper. He has lost about 13 pounds and his appetite is decreased, per his fiance. No nausea/vomiting. No fevers.    No obvious family history of GI cancers or IBD.     In the ED, he was tachycardic to 105, but afebrile and otherwise stable. WBC 12.5. . CT scan was notable for inflamed/thickened ileocolic region and sigmoid colon. In addition, he has a 4.2 x 1.8 cm fluid collection in the pelvis, likely either an abscess or a loop of bowel.    Upon further question, patient endorses some fullness in his rectal area when he has a bowel movement. Denies any tearing sensation or drainage into his underwear. Does feel some tissue hanging from his anus, unrelated to BMs.        Colonoscopy History:  None              Past Medical History:   No past medical history on file.          Past Surgical History:   No past surgical history on file.            Social History:     Social History     Tobacco Use     Smoking status: Not on file   Substance Use Topics     Alcohol use: Not on file             Family History:   No family history on file.          Allergies:   No Known Allergies          Medications:       methylPREDNISolone  20 mg Intravenous Q12H      "piperacillin-tazobactam  3.375 g Intravenous Q6H     sodium chloride (PF)  3 mL Intracatheter Q8H             Review of Systems:   A comprehensive greater than 10 system review of systems was carried out.  Pertinent positives and negatives are noted above.  Otherwise negative for contributory info.            Physical Exam:     Blood pressure 113/80, pulse 87, temperature 97.8  F (36.6  C), temperature source Oral, resp. rate 14, height 1.803 m (5' 11\"), weight 59.7 kg (131 lb 9.6 oz), SpO2 99 %.  No intake or output data in the 24 hours ending 11/14/20 1845    No acute distress. Fiance at bedside  Lying comfortably in bed  RRR  CTAB  Abdomen is soft, but firm/toned. Not distended. No tender at present. No palpable masses  Rectal exam: no evidence of purulence/fluctuance/erythema. One small skin tag. No hemorrhoids prolapsing. JUSTYN deferred           Data Reviewed:     Labs and imaging reviewed        Assessment and Plan:     Romina Dave is a 20-year-old previously healthy man, who presents with two months of crampy abdominal pain (periumbilical, which migrates to bilateral flanks when holding in stool or gas), some vague pelvic pain, and rectal bleeding. Workup in ED revealed: WBC 12.5, , iron deficiency anemia. CT scan showed inflammation of ileocolic region and sigmoid, with a 4.2 x 1.8 cm fluid collection in pelvis.     Symptoms, labs, and imaging concerning for IBD, like Crohn's given the distribution of inflammation. No known family history.    Currently, not septic. No acute surgical intervention needed for pelvic fluid collection. I had a long discussion with patient and his fiance about life with IBD, and the role of surgery in Crohn's vs UC. All questions were answered.    - Will continue to follow  - Appreciate input from GI; will defer steroid management and transition to biologics to GI  - Will discuss tomorrow whether colonoscopy is safe  - Continue zosyn for now  - Clear liquids  - Please " contact colorectal surgery if patient has acute worsening    Discussed with Dr. Celsa Disla MD  Colorectal Fellow  11/14/2020

## 2020-11-15 NOTE — PLAN OF CARE
Orientation: Alert and oriented x4  VSS. 98% on RA. Afebrile.    LS: clear and equal bilaterally  GI: Passing gas. no BM. Denies N/V.   : Adequate urine output.   Skin: intact. No apparent issues  Activity: Independent. Pt slept comfortably throughout shift.   Pain: 0/10. Denies pain throughout shift. No PRN interventions overnight  Plan: Continue with current cares.

## 2020-11-15 NOTE — PROGRESS NOTES
[facility]  Hospitalist Progress Note  Zaid Marcos MD 11/15/20    Reason for Stay (Diagnosis): Suspected Crohn's, abdominal abscess         Assessment and Plan:      Summary of Stay: Romina Dave is a 20 year old male no significant past medical history who presented with 2 months of intermittent abdominal pain along with some diarrhea hematochezia who was admitted on 11/14/2020 for suspected new Crohn's ileocolitis and possible intra-abdominal abscess.  Initially tachycardic although afebrile and not hypotensive.  Lab work showed mild leukocytosis, significant elevated CRP, and mild iron deficiency anemia.  CT the abdomen pelvis showed inflammation in the terminal ileum and sigmoid colon concerning for new Crohn's disease.  He was started on IV Solu-Medrol.  There is also a possible abdominal abscess so he has been started on IV Zosyn for this.  GI and colorectal surgery have been consulted while here.  Clinically improving with steroids and antibiotics.  Continue 1 additional day IV antibiotics and advance to low fiber diet.  Likely discharge tomorrow on oral steroids and oral antibiotics.    Problem List/Assessment and Plan:   Suspect Crohn's ileocolitis, possible intra-abdominal abscess: 2 months of intermittent lower abdominal pain that is worse with passing gas and stooling associated with hematochezia.  CT the abdomen pelvis shows wall thickening and inflammatory changes of the distal and terminal ileum along with a portion of the sigmoid colon which is suggestive of Crohn's ileocolitis.  No history of IBD, but this would fit given 2 months of symptoms and some bright red blood with stools.  There is concern for possible small abscess in the pelvis, 4.2 x 1.8 cm, however this could be a loop of bowel and not actually an abscess.  Initially was tachycardic, has elevated CRP ESR and leukocytosis so covering with empiric antibiotics.  Also, started IV steroids for likely Crohn's  "disease.  -[facility] consulted, appreciate recommendations  -Received 125 mg IV Solu-Medrol in the ER, continuing with 20 mg IV Solu-Medrol every 12 hours.  Anticipate likely change to budesonide p.o. tomorrow   -Empiric Zosyn given tachycardia, LR inflammatory markers, leukocytosis and possible abscess.  Feeling better, but would like to continue IV antibiotics at least 1 more day and then changed to Augmentin on discharge  .  -Colorectal surgery consulted, appreciate recommendations.  IR unlikely able to drain the potential abscess area easily  -Acetaminophen, IV Dilaudid, oxycodone   -Advance to low fiber diet  -Stop IV fluids  -Eventual outpatient colonoscopy    Iron deficiency anemia: Globin 10.2.  MCV is 73.  Iron level of 6, iron saturation index 2.  Ferritin level 42. Reports hematochezia for years.  Most likely iron deficiency anemia.  Should improve with control of the likely Crohn's disease which should help with the hematochezia.    DVT Prophylaxis: Pneumatic Compression Devices  Code Status: Full Code  FEN:  Advance to low fiber diet  Discharge Dispo: Home.  He does not think he has any insurance, but may be covered by his mother's insurance.  Consulted social work/financial counselor  Estimated Disch Date / # of Days until Disch: Doing well today, advancing diet.  Possible discharge tomorrow on oral steroid and oral antibiotic        Interval History (Subjective):      He is feeling better overnight.  Not having diarrhea currently.  He denies any lower abdominal pain this morning with clear liquid diet and feels hungry.  Currently afebrile.  Leukocytosis improved.                    Physical Exam:      Last Vital Signs:  /64 (BP Location: Left arm)   Pulse 62   Temp 97.5  F (36.4  C) (Oral)   Resp 18   Ht 1.803 m (5' 11\")   Wt 59.7 kg (131 lb 9.6 oz)   SpO2 100%   BMI 18.35 kg/m        Intake/Output Summary (Last 24 hours) at 11/15/2020 1324  Last data filed at 11/15/2020 0654  Gross per 24 " hour   Intake 1359 ml   Output 150 ml   Net 1209 ml       Constitutional: Awake, NAD   Eyes: sclera white   HEENT:  MMM  Respiratory:  lungs cta bilaterally, no crackles or wheeze  Cardiovascular: RRR.  No murmur   GI: Thin, very subtle tenderness in the lower middle abdominal area to palpation without any guarding, bowel sounds present  Skin: no rash  Musculoskeletal/extremities: No edema  Neurologic: A&O  Psychiatric: calm, cooperative         Medications:      All current medications were reviewed with changes reflected in problem list.         Data:      All new lab and imaging data was reviewed.   Labs:  Recent Labs   Lab 11/14/20  1307 11/14/20  1258   CULT No growth after 14 hours No growth after 14 hours     Recent Labs   Lab 11/15/20  0709 11/14/20  1046    139   POTASSIUM 4.1 3.8   CHLORIDE 109 108   CO2 27 26   ANIONGAP 5 5   * 79   BUN 10 11   CR 1.03 1.12   GFRESTIMATED >90 >90   GFRESTBLACK >90 >90   LUCIANO 8.3* 8.4*     Recent Labs   Lab 11/15/20  0709 11/14/20  1046   WBC 10.6 12.5*   HGB 9.6* 10.2*   HCT 33.7* 35.4*   MCV 73* 73*   * 554*   Covid 19 PCR negative    Imaging:   None today      Zaid Marcos MD

## 2020-11-16 VITALS
RESPIRATION RATE: 16 BRPM | OXYGEN SATURATION: 98 % | DIASTOLIC BLOOD PRESSURE: 69 MMHG | TEMPERATURE: 97.6 F | HEART RATE: 68 BPM | BODY MASS INDEX: 18.42 KG/M2 | SYSTOLIC BLOOD PRESSURE: 105 MMHG | WEIGHT: 131.6 LBS | HEIGHT: 71 IN

## 2020-11-16 LAB
ANION GAP SERPL CALCULATED.3IONS-SCNC: 1 MMOL/L (ref 3–14)
BUN SERPL-MCNC: 11 MG/DL (ref 7–30)
CALCIUM SERPL-MCNC: 8.3 MG/DL (ref 8.5–10.1)
CHLORIDE SERPL-SCNC: 110 MMOL/L (ref 94–109)
CO2 SERPL-SCNC: 29 MMOL/L (ref 20–32)
CREAT SERPL-MCNC: 1.04 MG/DL (ref 0.66–1.25)
CRP SERPL-MCNC: 73.8 MG/L (ref 0–8)
ERYTHROCYTE [DISTWIDTH] IN BLOOD BY AUTOMATED COUNT: 15.5 % (ref 10–15)
GAMMA INTERFERON BACKGROUND BLD IA-ACNC: 0.06 IU/ML
GFR SERPL CREATININE-BSD FRML MDRD: >90 ML/MIN/{1.73_M2}
GLUCOSE SERPL-MCNC: 125 MG/DL (ref 70–99)
HBV SURFACE AG SERPL QL IA: NONREACTIVE
HCT VFR BLD AUTO: 33.2 % (ref 40–53)
HGB BLD-MCNC: 9.5 G/DL (ref 13.3–17.7)
M TB IFN-G CD4+ BCKGRND COR BLD-ACNC: 0.72 IU/ML
M TB TUBERC IFN-G BLD QL: NEGATIVE
MCH RBC QN AUTO: 21.3 PG (ref 26.5–33)
MCHC RBC AUTO-ENTMCNC: 28.6 G/DL (ref 31.5–36.5)
MCV RBC AUTO: 75 FL (ref 78–100)
MITOGEN IGNF BCKGRD COR BLD-ACNC: 0 IU/ML
MITOGEN IGNF BCKGRD COR BLD-ACNC: 0 IU/ML
PLATELET # BLD AUTO: 567 10E9/L (ref 150–450)
POTASSIUM SERPL-SCNC: 4.3 MMOL/L (ref 3.4–5.3)
RBC # BLD AUTO: 4.45 10E12/L (ref 4.4–5.9)
SODIUM SERPL-SCNC: 140 MMOL/L (ref 133–144)
WBC # BLD AUTO: 11 10E9/L (ref 4–11)

## 2020-11-16 PROCEDURE — 99239 HOSP IP/OBS DSCHRG MGMT >30: CPT | Performed by: INTERNAL MEDICINE

## 2020-11-16 PROCEDURE — G0008 ADMIN INFLUENZA VIRUS VAC: HCPCS | Performed by: INTERNAL MEDICINE

## 2020-11-16 PROCEDURE — 90686 IIV4 VACC NO PRSV 0.5 ML IM: CPT | Performed by: INTERNAL MEDICINE

## 2020-11-16 PROCEDURE — 85027 COMPLETE CBC AUTOMATED: CPT | Performed by: INTERNAL MEDICINE

## 2020-11-16 PROCEDURE — 250N000011 HC RX IP 250 OP 636: Performed by: INTERNAL MEDICINE

## 2020-11-16 PROCEDURE — 80048 BASIC METABOLIC PNL TOTAL CA: CPT | Performed by: INTERNAL MEDICINE

## 2020-11-16 PROCEDURE — 36415 COLL VENOUS BLD VENIPUNCTURE: CPT | Performed by: INTERNAL MEDICINE

## 2020-11-16 PROCEDURE — 86140 C-REACTIVE PROTEIN: CPT | Performed by: INTERNAL MEDICINE

## 2020-11-16 RX ORDER — PREDNISONE 10 MG/1
TABLET ORAL
Qty: 126 TABLET | Refills: 0 | Status: SHIPPED | OUTPATIENT
Start: 2020-11-16 | End: 2021-02-08

## 2020-11-16 RX ADMIN — TAZOBACTAM SODIUM AND PIPERACILLIN SODIUM 3.38 G: 375; 3 INJECTION, SOLUTION INTRAVENOUS at 09:05

## 2020-11-16 RX ADMIN — INFLUENZA A VIRUS A/GUANGDONG-MAONAN/SWL1536/2019 CNIC-1909 (H1N1) ANTIGEN (FORMALDEHYDE INACTIVATED), INFLUENZA A VIRUS A/HONG KONG/2671/2019 (H3N2) ANTIGEN (FORMALDEHYDE INACTIVATED), INFLUENZA B VIRUS B/PHUKET/3073/2013 ANTIGEN (FORMALDEHYDE INACTIVATED), AND INFLUENZA B VIRUS B/WASHINGTON/02/2019 ANTIGEN (FORMALDEHYDE INACTIVATED) 0.5 ML: 15; 15; 15; 15 INJECTION, SUSPENSION INTRAMUSCULAR at 15:54

## 2020-11-16 RX ADMIN — TAZOBACTAM SODIUM AND PIPERACILLIN SODIUM 3.38 G: 375; 3 INJECTION, SOLUTION INTRAVENOUS at 02:44

## 2020-11-16 RX ADMIN — METHYLPREDNISOLONE SODIUM SUCCINATE 20 MG: 40 INJECTION, POWDER, FOR SOLUTION INTRAMUSCULAR; INTRAVENOUS at 09:04

## 2020-11-16 NOTE — PROGRESS NOTES
"GASTROENTEROLOGY PROGRESS NOTE        SUBJECTIVE:  No abdominal pain, nausea or vomiting. Tolerating meal. Passed BM this morning, no blood in stool.      OBJECTIVE:    /69   Pulse 68   Temp 97.6  F (36.4  C) (Oral)   Resp 16   Ht 1.803 m (5' 11\")   Wt 59.7 kg (131 lb 9.6 oz)   SpO2 98%   BMI 18.35 kg/m    Temp (24hrs), Av.6  F (36.4  C), Min:97.5  F (36.4  C), Max:97.8  F (36.6  C)    Patient Vitals for the past 72 hrs:   Weight   20 1651 59.7 kg (131 lb 9.6 oz)   20 0937 61.2 kg (135 lb)       Intake/Output Summary (Last 24 hours) at 2020 1024  Last data filed at 2020 0630  Gross per 24 hour   Intake 1920 ml   Output --   Net 1920 ml        PHYSICAL EXAM     Constitutional: NAD  Abdomen: thin, soft, NTTP         Additional Comments:  ROS, FH, SH: See initial GI consult for details.     I have reviewed the patient's new clinical lab results:     Recent Labs   Lab Test 20  0643 11/15/20  0709 20  1046   WBC 11.0 10.6 12.5*   HGB 9.5* 9.6* 10.2*   MCV 75* 73* 73*   * 537* 554*     Recent Labs   Lab Test 20  0643 11/15/20  0709 20  1046   POTASSIUM 4.3 4.1 3.8   CHLORIDE 110* 109 108   CO2 29 27 26   BUN 11 10 11   ANIONGAP 1* 5 5     Recent Labs   Lab Test 20  1046 20  1039   ALBUMIN 2.8*  --    BILITOTAL 0.4  --    ALT 14  --    AST 12  --    PROTEIN  --  20*   LIPASE 137  --         ASSESSMENT/ PLAN  Romina Dave is a 19 yo male without significant past medical history who presented to the hospital with abdominal pain, diarrhea, and hematochezia found to have CT evidence of TI and sigmoid inflammation with likely pelvic abscess, anemia, and leukocytosis.    1.  Ileocolitis with suspected pelvic abscess: Patient has done well with conservative management.  He has been managed with antibiotics and steroids.  He has no further abdominal pain and is tolerating a regular diet.  He has been followed by colorectal surgery.    --Plan for " repeat CT scan of the abdomen and pelvis in 2 weeks to evaluate for resolution of abscess.  -- Complete an 8-week prednisone taper starting with 40 mg and decreasing by 5 mg each week.  -- Continue antibiotics on discharge.  --  Recommend outpatient clinic follow-up with IBD MD in 3 to 4 weeks.  My office will contact patient to arrange.  Eventual colonoscopy pending outpatient clinic visit recommendations      Discussed with Dr. De Jesus.  Dee Francis PA-C  [facility] ( [facility])

## 2020-11-16 NOTE — PROGRESS NOTES
Tolerating diet, no pain, labs/vitals ok.  Anticipate discharge today.  Awaiting recs from GI and CRS regarding prednisone dose and duration along with specific follow up that may be needed.  Will also order augmentin on discharge and he will need follow up CT scan in 10-14 days per GI recs to follow up on abscess.

## 2020-11-16 NOTE — PLAN OF CARE
AOx4, up Indep, resting comfortably this evening, denies pain, CMS intact, abdomen soft nontender. BS active, last stool was 11/15 loose, no blood present.   Plan is for the pt to contact his insurance in the AM for post-op coverage questions, SW following.   Needs a few more tests to rule out/confirm Chron's.   Will switch to oral prednisone and antibiotics on 11/16.

## 2020-11-16 NOTE — PLAN OF CARE
Flu shot administered and teaching sheet given.  Reviewed discharge medications.  Patient able to correctly discuss administration using teach back method.  Discharged to home at 1600.

## 2020-11-16 NOTE — DISCHARGE INSTRUCTIONS
[facility] will be calling you to arrange for outpatient appointments. If you do not hear from them or have questions, please call at [phone].    Follow up CT in 14 days.

## 2020-11-16 NOTE — PLAN OF CARE
Orientation: Alert and oriented x4  VSS. 99% on RA. Afebrile.    LS: clear and equal bilaterally  GI: Passing gas. no BM. Denies N/V.   : Adequate urine output.   Skin: intact no apparent issues  Activity: Independent. Pt slept comfortably throughout shift.   Pain: 0/10. Denies pain throughout shift. No PRN interventions overnight  Plan: Continue with current cares.

## 2020-11-16 NOTE — DISCHARGE SUMMARY
[facility]  Discharge Summary  Name: Romina Dave    MRN: [id number]  YOB: 2000    Age: 20 year old  Date of Discharge:  11/16/2020  Date of Admission: 11/14/2020  Primary Care Provider: Corrie Ref-Primary, Physician  Discharge Physician:  Zaid Marcos MD  Discharging Service:  Hospitalist      Discharge Diagnoses:  Crohn's ileocolitis  Possible intra-abdominal abscess  Iron deficiency anemia     Hospital Course:  Summary of Stay: Romina Dave is a 20 year old male no significant past medical history who presented with 2 months of intermittent abdominal pain along with some diarrhea hematochezia who was admitted on 11/14/2020 for suspected new Crohn's ileocolitis and possible intra-abdominal abscess.  Initially tachycardic although afebrile and not hypotensive.  Lab work showed mild leukocytosis, significant elevated CRP, and mild iron deficiency anemia.  CT the abdomen pelvis showed inflammation in the terminal ileum and sigmoid colon concerning for new Crohn's disease.  He was started on IV Solu-Medrol.  There is also a possible abdominal abscess so he has been started on IV Zosyn for this.  GI and colorectal surgery have been consulted while here.  Clinically improving with steroids and antibiotics.  Change antibiotics to oral Augmentin complete 14 days of antibiotics.  Prolonged prednisone taper on discharge starting at 40 mg daily then decreasing by 5 mg/week.  Repeat CT/abdomen pelvis in 10-14 days to follow-up on abdominal abscess.  Follow-up with IBD clinic through [facility] in 3 to 4 weeks.  Establish care with primary care provider.  Seen by financial counselor.     Problem List/Assessment and Plan:   Suspect Crohn's ileocolitis, possible intra-abdominal abscess: 2 months of intermittent lower abdominal pain that is worse with passing gas and stooling associated with hematochezia.  CT the abdomen pelvis shows wall thickening and inflammatory changes of the distal and  terminal ileum along with a portion of the sigmoid colon which is suggestive of Crohn's ileocolitis.  No history of IBD, but this would fit given 2 months of symptoms and some bright red blood with stools.  There is concern for possible small abscess in the pelvis, 4.2 x 1.8 cm, however this could be a loop of bowel and not actually an abscess.  Initially was tachycardic, has elevated CRP ESR and leukocytosis so covering with empiric antibiotics.  Also, started IV steroids for likely Crohn's disease.  -[facility] consulted, appreciate recommendations  -Clinically improved on IV Solu-Medrol.  Prolonged prednisone taper on discharge starting at 40 mg daily then decreasing by 5 mg/week. Follow-up with IBD clinic through [facility] in 3 to 4 weeks.   -Change IV Zosyn to oral Augmentin twice daily, complete total 14-day antibiotics  -Repeat CT/abdomen pelvis in 10-14 days to follow-up on abdominal abscess, results can be followed up with new primary provider or CRS or [facility]  -Colorectal surgery consulted, appreciate recommendations.  IR unlikely able to drain the potential abscess area easily.  Follow-up CT recommended  -Low fiber diet for 2 weeks  -Eventual outpatient colonoscopy  -Tolerating low fiber diet with no pain, inflammatory markers improved, leukocytosis resolved, no fever so discharging home     Iron deficiency anemia: Globin 10.2.  MCV is 73.  Iron level of 6, iron saturation index 2.  Ferritin level 42. Reports hematochezia for years.  Most likely iron deficiency anemia.  Should improve with control of the likely Crohn's disease which should help with the hematochezia.     Discharge Disposition:  Discharged to home     Allergies:  No Known Allergies     Discharge Medications:   Current Discharge Medication List      START taking these medications    Details   amoxicillin-clavulanate (AUGMENTIN) 875-125 MG tablet Take 1 tablet by mouth 2 times daily for 11 days  Qty: 22 tablet, Refills: 0    Associated Diagnoses:  "Crohn's disease of both small and large intestine with abscess (H)      predniSONE (DELTASONE) 10 MG tablet Take 40mg daily for 7 days, then 35mg daily for 7 days, then 30mg daily for 7 days, then 25mg daily for 7 days, then 20mg daily for 7 days, then 15mg daily for 7 days, then 10mg daily for 7 days, then 5mg daily for 7 days  Qty: 126 tablet, Refills: 0    Associated Diagnoses: Crohn's disease of both small and large intestine with abscess (H)         CONTINUE these medications which have NOT CHANGED    Details   multivitamin CF FORMULA (CHOICEFUL) chewable tablet Take 1 tablet by mouth daily              Condition on Discharge:  Discharge condition: Good   Discharge vitals: Blood pressure 105/69, pulse 68, temperature 97.6  F (36.4  C), temperature source Oral, resp. rate 16, height 1.803 m (5' 11\"), weight 59.7 kg (131 lb 9.6 oz), SpO2 98 %.   Code status on discharge: Full Code     History of Illness:  See detailed admission note for full details.    Physical Exam:  Blood pressure 105/69, pulse 68, temperature 97.6  F (36.4  C), temperature source Oral, resp. rate 16, height 1.803 m (5' 11\"), weight 59.7 kg (131 lb 9.6 oz), SpO2 98 %.  Wt Readings from Last 1 Encounters:   11/14/20 59.7 kg (131 lb 9.6 oz)     Constitutional: Awake, NAD   Eyes: sclera white   HEENT:   MMM  Respiratory:  lungs cta bilaterally, no crackles or wheeze  Cardiovascular: RRR.  No murmur   GI: Thin, non-tender to palpation in all quadrants, not distended, bowel sounds present  Skin: no rash   Musculoskeletal/extremities: No edema  Neurologic: A&O, speech clear  Psychiatric: calm, cooperative    Procedures other than Imaging:  None     Imaging:  Results for orders placed or performed during the hospital encounter of 11/14/20   Abd/pelvis CT,  IV  contrast only TRAUMA / AAA    Narrative    CT ABDOMEN PELVIS W CONTRAST 11/14/2020 11:19 AM    CLINICAL HISTORY: periumbilical pain    TECHNIQUE: CT scan of the abdomen and pelvis was performed " following  injection of IV contrast. Multiplanar reformats were obtained. Dose  reduction techniques were used.  CONTRAST: 68mL Isovue-370    COMPARISON: None.    FINDINGS:   LOWER CHEST: The visualized lung bases are clear.    HEPATOBILIARY: No significant mass or bile duct dilatation. No  calcified gallstones.     PANCREAS: Normal.    SPLEEN: Normal.    ADRENAL GLANDS: Normal.    KIDNEYS/BLADDER: Small amount of excreted contrast in the renal  collecting system and ureters. No renal masses, or hydronephrosis. No  definite renal calculi.    BOWEL: Approximately 25 cm length of the distal and terminal ileum  demonstrate marked circumferential wall thickening, mural  hyperenhancement, mild dilatation and surrounding inflammatory changes  with prominence of the vasa rectae. About 6 cm long segment of the  sigmoid colon also demonstrates circumferential wall thickening.  Findings are most concerning for inflammatory bowel disease such as  Crohn's ileocolitis. Between loops of small bowel and the sigmoid  colon, there is a 4.2 x 1.8 cm fluid collection (series 3, image 158)  with punctate foci of gas within it. This probably represents an  abscess although given the multiple inflamed bowel loops this could  represent an abnormal bowel loop. No small bowel obstruction or  colonic obstruction. Trace free fluid in the right paracolic gutter.  The appendix is normal.    PELVIC ORGANS: No pelvic masses.    ADDITIONAL FINDINGS: Major intra-abdominal vasculature is patent.  Mildly enlarged mesenteric lymph nodes are likely reactive.    MUSCULOSKELETAL: Normal.      Impression    IMPRESSION:   1.  Marked wall thickening, mural hyperenhancement and inflammatory  changes involving about 25 cm of the distal and terminal ileum and  inflammatory changes involving the sigmoid colon most suspicious for  inflammatory bowel disease such as Crohn's ileocolitis.  2.  Likely small abscess in the pelvis between small bowel loops and  the  sigmoid colon. This is less likely an abnormal collapsed bowel  loop and oral contrast could be administered for confirmation if  clinically desired. Percutaneous aspiration or catheter placement into  this abscess would be difficult due to presence of multiple adjacent  inflamed bowel loops.    SYLVIA JACOB MD        Consultations:  Consultation during this admission received from gastroenterology and colorectal surgery.       Recent Lab Results:  Recent Labs   Lab 11/16/20  0643 11/15/20  0709 11/14/20  1046   WBC 11.0 10.6 12.5*   HGB 9.5* 9.6* 10.2*   HCT 33.2* 33.7* 35.4*   MCV 75* 73* 73*   * 537* 554*     Recent Labs   Lab 11/14/20  1307 11/14/20  1258   CULT No growth after 2 days No growth after 2 days     Recent Labs   Lab 11/16/20  0643 11/15/20  0709 11/14/20  1046    141 139   POTASSIUM 4.3 4.1 3.8   CHLORIDE 110* 109 108   CO2 29 27 26   ANIONGAP 1* 5 5   * 138* 79   BUN 11 10 11   CR 1.04 1.03 1.12   GFRESTIMATED >90 >90 >90   GFRESTBLACK >90 >90 >90   LUCIANO 8.3* 8.3* 8.4*   PROTTOTAL  --   --  7.5   ALBUMIN  --   --  2.8*   BILITOTAL  --   --  0.4   ALKPHOS  --   --  68   AST  --   --  12   ALT  --   --  14     Recent Labs   Lab 11/16/20  0643 11/14/20  1046   SED  --  42*   CRP 73.8* 120.0*     Recent Labs   Lab 11/14/20  1257   LACT 0.6*     COVID-19 PCR negative  Hepatitis B surface antigen nonreactive  MTB quantiferon testing negative    Enteric bacteria and virus stool panel negative       Pending Results:    Unresulted Labs Ordered in the Past 30 Days of this Admission     Date and Time Order Name Status Description    11/15/2020 0325 Thiopurine Methyltransferase RBC In process     11/14/2020 1306 Blood culture Preliminary     11/14/2020 1246 Blood culture Preliminary          These results will be followed up by patient's primary care provider.    Discharge Instructions and Follow-Up:   Discharge Procedure Orders   CT Abdomen pelvis w & w/o contrast   Standing Status: Future  Standing Exp. Date: 02/14/21   Order Comments: Results sent to [facility] and primary care provider     Order Specific Question Answer Comments   Clinical Info for the Interpreting Provider new crohns disease, but also suspect abdominal abscess.  reevaluate the abscess    Priority Routine      Reason for your hospital stay   Order Comments: You were hospitalized for abdominal pain and found to have likely new diagnosis of Crohn's disease.  You will be on a prolonged prednisone taper with close follow-up in the [facility] inflammatory bowel disorder clinic.  There is also concern for an abscess in the abdomen so you will be on Augmentin an antibiotic for an additional 11 days.  It is recommend you have a repeat abdominal CT scan in 10 to 14 days to further look at the abscess and the results can be followed up with the [facility] or with the colorectal surgery clinic.  You will also need a primary care provider moving forward.     Follow-up and recommended labs and tests    Order Comments: Follow up with primary care provider, Physician No Ref-Primary, within 10-14 days for follow up of CT results and to establish care.     Activity   Order Comments: Your activity upon discharge: activity as tolerated     Order Specific Question Answer Comments   Is discharge order? Yes      Full Code     Order Specific Question Answer Comments   Code status determined by: Discussion with patient/ legal decision maker      Diet   Order Comments: Follow this diet upon discharge: Orders Placed This Encounter      Low Fiber Diet for 2 weeks then regular diet     Order Specific Question Answer Comments   Is discharge order? Yes          I, Zaid Marcos MD, personally saw the patient today and spent greater than 30 minutes discharging this patient.    Zaid Marcos MD

## 2020-11-16 NOTE — PLAN OF CARE
Vital signs: Stable; B/P: 105/69, Temp: 97.6, HR: 68, RR: 16  Pain Control: Mild cramping present when eating then subsides after.  Diet: Tolerating low fiber diet.  Output: Voiding adequately. Loose stool x1. Passing flatus.  Activity: Up independently in room.   Updates: BS active. Abdomen slightly rounded. IV saline locked between IV antibiotics.    Plan: Discharging home with follow up with GI.    Adequate for discharge. Discharge teaching completed, questions and concerns answered, medications reviewed and resources provided. Pt discharging home with Fiance.

## 2020-11-16 NOTE — PROGRESS NOTES
COLON & RECTAL SURGERY  PROGRESS NOTE    November 16, 2020  Hospital Day #3    SUBJECTIVE:  Patient reports occasional abdominal discomfort.  He is tolerating a low fiber diet without nausea or vomiting.  He is passing gas and last passed stool this morning, no blood.    OBJECTIVE:  Temp:  [97.5  F (36.4  C)-97.8  F (36.6  C)] 97.5  F (36.4  C)  Pulse:  [62-83] 63  Resp:  [16-18] 16  BP: ()/(60-70) 92/60  SpO2:  [98 %-100 %] 99 %    Intake/Output Summary (Last 24 hours) at 11/16/2020 0853  Last data filed at 11/15/2020 1800  Gross per 24 hour   Intake 1800 ml   Output --   Net 1800 ml       GENERAL:  Awake, alert, no acute distress, sitting up in bed  HEAD: Nomocephalic atraumatic  SCLERA: anicteric  ABDOMEN:  Soft, non tender, non-distended, no rebound or guarding, no peritoneal signs.    LABS:  Lab Results   Component Value Date    WBC 11.0 11/16/2020     Lab Results   Component Value Date    HGB 9.5 11/16/2020     Lab Results   Component Value Date    HCT 33.2 11/16/2020     Lab Results   Component Value Date     11/16/2020     Last Basic Metabolic Panel:  Lab Results   Component Value Date     11/16/2020      Lab Results   Component Value Date    POTASSIUM 4.3 11/16/2020     Lab Results   Component Value Date    CHLORIDE 110 11/16/2020     Lab Results   Component Value Date    LUCIANO 8.3 11/16/2020     Lab Results   Component Value Date    CO2 29 11/16/2020     Lab Results   Component Value Date    BUN 11 11/16/2020     Lab Results   Component Value Date    CR 1.04 11/16/2020     Lab Results   Component Value Date     11/16/2020       ASSESSMENT/PLAN:  20 year old man admitted with abdominal pain, rectal bleeding, anemia with CT showing TI and sigmoid inflammation and likely pelvic abscess, suspicious for Crohn's.  Responding to medical management.  CRP 73.8.  WBC 11.  Hgb 9.5.    - Continue low fiber diet  - Steroids and antibiotics per GI  - Pain management as needed  - If continues to  improve clinically, additional work up as outpatient  - If worsens, repeat imaging and may need to consider surgery this hospitalization   - No plans for surgery at this time      For questions/paging, please contact the CRS office at [phone].    Anushka Lima PA-C  [facility]  Phone: [phone]

## 2020-11-17 LAB — TPMT BLD-CCNC: 19.8 U/ML (ref 24–44)

## 2020-11-20 LAB
BACTERIA SPEC CULT: NO GROWTH
BACTERIA SPEC CULT: NO GROWTH
SPECIMEN SOURCE: NORMAL
SPECIMEN SOURCE: NORMAL

## 2021-02-08 ENCOUNTER — APPOINTMENT (OUTPATIENT)
Dept: CT IMAGING | Facility: CLINIC | Age: 21
End: 2021-02-08
Attending: EMERGENCY MEDICINE
Payer: COMMERCIAL

## 2021-02-08 ENCOUNTER — HOSPITAL ENCOUNTER (INPATIENT)
Facility: CLINIC | Age: 21
LOS: 2 days | Discharge: HOME | End: 2021-02-10
Attending: EMERGENCY MEDICINE | Admitting: INTERNAL MEDICINE
Payer: COMMERCIAL

## 2021-02-08 DIAGNOSIS — K50.019 CROHN'S DISEASE OF SMALL INTESTINE WITH COMPLICATION (H): ICD-10-CM

## 2021-02-08 LAB
ANION GAP SERPL CALCULATED.3IONS-SCNC: 3 MMOL/L (ref 3–14)
BASOPHILS # BLD AUTO: 0 10E9/L (ref 0–0.2)
BASOPHILS NFR BLD AUTO: 0.4 %
BUN SERPL-MCNC: 11 MG/DL (ref 7–30)
CALCIUM SERPL-MCNC: 9.2 MG/DL (ref 8.5–10.1)
CHLORIDE SERPL-SCNC: 110 MMOL/L (ref 94–109)
CO2 SERPL-SCNC: 25 MMOL/L (ref 20–32)
CREAT SERPL-MCNC: 1.17 MG/DL (ref 0.66–1.25)
CRP SERPL-MCNC: 15.1 MG/L (ref 0–8)
DIFFERENTIAL METHOD BLD: ABNORMAL
EOSINOPHIL # BLD AUTO: 0.1 10E9/L (ref 0–0.7)
EOSINOPHIL NFR BLD AUTO: 1.1 %
ERYTHROCYTE [DISTWIDTH] IN BLOOD BY AUTOMATED COUNT: 23.9 % (ref 10–15)
ERYTHROCYTE [SEDIMENTATION RATE] IN BLOOD BY WESTERGREN METHOD: 6 MM/H (ref 0–15)
GFR SERPL CREATININE-BSD FRML MDRD: 88 ML/MIN/{1.73_M2}
GLUCOSE SERPL-MCNC: 82 MG/DL (ref 70–99)
HCT VFR BLD AUTO: 50.3 % (ref 40–53)
HGB BLD-MCNC: 15.5 G/DL (ref 13.3–17.7)
IMM GRANULOCYTES # BLD: 0 10E9/L (ref 0–0.4)
IMM GRANULOCYTES NFR BLD: 0.1 %
LABORATORY COMMENT REPORT: NORMAL
LYMPHOCYTES # BLD AUTO: 1.3 10E9/L (ref 0.8–5.3)
LYMPHOCYTES NFR BLD AUTO: 17.3 %
MCH RBC QN AUTO: 24.6 PG (ref 26.5–33)
MCHC RBC AUTO-ENTMCNC: 30.8 G/DL (ref 31.5–36.5)
MCV RBC AUTO: 80 FL (ref 78–100)
MONOCYTES # BLD AUTO: 0.5 10E9/L (ref 0–1.3)
MONOCYTES NFR BLD AUTO: 6.4 %
NEUTROPHILS # BLD AUTO: 5.5 10E9/L (ref 1.6–8.3)
NEUTROPHILS NFR BLD AUTO: 74.7 %
NRBC # BLD AUTO: 0 10*3/UL
NRBC BLD AUTO-RTO: 0 /100
PLATELET # BLD AUTO: 340 10E9/L (ref 150–450)
POTASSIUM SERPL-SCNC: 4.1 MMOL/L (ref 3.4–5.3)
RBC # BLD AUTO: 6.3 10E12/L (ref 4.4–5.9)
SARS-COV-2 RNA RESP QL NAA+PROBE: NEGATIVE
SODIUM SERPL-SCNC: 138 MMOL/L (ref 133–144)
SPECIMEN SOURCE: NORMAL
WBC # BLD AUTO: 7.3 10E9/L (ref 4–11)

## 2021-02-08 PROCEDURE — 250N000009 HC RX 250: Performed by: EMERGENCY MEDICINE

## 2021-02-08 PROCEDURE — 120N000004 HC R&B MS OVERFLOW

## 2021-02-08 PROCEDURE — 96375 TX/PRO/DX INJ NEW DRUG ADDON: CPT

## 2021-02-08 PROCEDURE — 96376 TX/PRO/DX INJ SAME DRUG ADON: CPT

## 2021-02-08 PROCEDURE — 96361 HYDRATE IV INFUSION ADD-ON: CPT

## 2021-02-08 PROCEDURE — 85652 RBC SED RATE AUTOMATED: CPT | Performed by: EMERGENCY MEDICINE

## 2021-02-08 PROCEDURE — 99285 EMERGENCY DEPT VISIT HI MDM: CPT | Mod: 25

## 2021-02-08 PROCEDURE — 80048 BASIC METABOLIC PNL TOTAL CA: CPT | Performed by: EMERGENCY MEDICINE

## 2021-02-08 PROCEDURE — C9803 HOPD COVID-19 SPEC COLLECT: HCPCS

## 2021-02-08 PROCEDURE — 99222 1ST HOSP IP/OBS MODERATE 55: CPT | Mod: AI | Performed by: INTERNAL MEDICINE

## 2021-02-08 PROCEDURE — 258N000003 HC RX IP 258 OP 636: Performed by: EMERGENCY MEDICINE

## 2021-02-08 PROCEDURE — 87635 SARS-COV-2 COVID-19 AMP PRB: CPT | Performed by: EMERGENCY MEDICINE

## 2021-02-08 PROCEDURE — 85025 COMPLETE CBC W/AUTO DIFF WBC: CPT | Performed by: EMERGENCY MEDICINE

## 2021-02-08 PROCEDURE — 250N000011 HC RX IP 250 OP 636: Performed by: EMERGENCY MEDICINE

## 2021-02-08 PROCEDURE — 258N000003 HC RX IP 258 OP 636: Performed by: INTERNAL MEDICINE

## 2021-02-08 PROCEDURE — 96374 THER/PROPH/DIAG INJ IV PUSH: CPT

## 2021-02-08 PROCEDURE — 250N000011 HC RX IP 250 OP 636: Performed by: INTERNAL MEDICINE

## 2021-02-08 PROCEDURE — 74177 CT ABD & PELVIS W/CONTRAST: CPT

## 2021-02-08 PROCEDURE — 86140 C-REACTIVE PROTEIN: CPT | Performed by: EMERGENCY MEDICINE

## 2021-02-08 RX ORDER — PROCHLORPERAZINE MALEATE 10 MG
10 TABLET ORAL EVERY 6 HOURS PRN
Status: DISCONTINUED | OUTPATIENT
Start: 2021-02-08 | End: 2021-02-10 | Stop reason: HOSPADM

## 2021-02-08 RX ORDER — METHYLPREDNISOLONE SODIUM SUCCINATE 40 MG/ML
20 INJECTION, POWDER, LYOPHILIZED, FOR SOLUTION INTRAMUSCULAR; INTRAVENOUS EVERY 6 HOURS
Status: DISCONTINUED | OUTPATIENT
Start: 2021-02-08 | End: 2021-02-09

## 2021-02-08 RX ORDER — ONDANSETRON 4 MG/1
4 TABLET, ORALLY DISINTEGRATING ORAL EVERY 6 HOURS PRN
Status: DISCONTINUED | OUTPATIENT
Start: 2021-02-08 | End: 2021-02-10 | Stop reason: HOSPADM

## 2021-02-08 RX ORDER — ACETAMINOPHEN 650 MG/1
650 SUPPOSITORY RECTAL EVERY 4 HOURS PRN
Status: DISCONTINUED | OUTPATIENT
Start: 2021-02-08 | End: 2021-02-10 | Stop reason: HOSPADM

## 2021-02-08 RX ORDER — SODIUM CHLORIDE 9 MG/ML
INJECTION, SOLUTION INTRAVENOUS CONTINUOUS
Status: DISCONTINUED | OUTPATIENT
Start: 2021-02-08 | End: 2021-02-08

## 2021-02-08 RX ORDER — NALOXONE HYDROCHLORIDE 0.4 MG/ML
0.4 INJECTION, SOLUTION INTRAMUSCULAR; INTRAVENOUS; SUBCUTANEOUS
Status: DISCONTINUED | OUTPATIENT
Start: 2021-02-08 | End: 2021-02-10 | Stop reason: HOSPADM

## 2021-02-08 RX ORDER — ONDANSETRON 2 MG/ML
4 INJECTION INTRAMUSCULAR; INTRAVENOUS EVERY 30 MIN PRN
Status: DISCONTINUED | OUTPATIENT
Start: 2021-02-08 | End: 2021-02-08

## 2021-02-08 RX ORDER — OXYCODONE HYDROCHLORIDE 5 MG/1
5-10 TABLET ORAL
Status: DISCONTINUED | OUTPATIENT
Start: 2021-02-08 | End: 2021-02-10 | Stop reason: HOSPADM

## 2021-02-08 RX ORDER — PROCHLORPERAZINE 25 MG
25 SUPPOSITORY, RECTAL RECTAL EVERY 12 HOURS PRN
Status: DISCONTINUED | OUTPATIENT
Start: 2021-02-08 | End: 2021-02-10 | Stop reason: HOSPADM

## 2021-02-08 RX ORDER — ONDANSETRON 2 MG/ML
4 INJECTION INTRAMUSCULAR; INTRAVENOUS EVERY 6 HOURS PRN
Status: DISCONTINUED | OUTPATIENT
Start: 2021-02-08 | End: 2021-02-10 | Stop reason: HOSPADM

## 2021-02-08 RX ORDER — HYDROMORPHONE HYDROCHLORIDE 1 MG/ML
0.5 INJECTION, SOLUTION INTRAMUSCULAR; INTRAVENOUS; SUBCUTANEOUS
Status: DISCONTINUED | OUTPATIENT
Start: 2021-02-08 | End: 2021-02-08

## 2021-02-08 RX ORDER — LIDOCAINE 40 MG/G
CREAM TOPICAL
Status: DISCONTINUED | OUTPATIENT
Start: 2021-02-08 | End: 2021-02-10 | Stop reason: HOSPADM

## 2021-02-08 RX ORDER — METHYLPREDNISOLONE SODIUM SUCCINATE 40 MG/ML
20 INJECTION, POWDER, LYOPHILIZED, FOR SOLUTION INTRAMUSCULAR; INTRAVENOUS ONCE
Status: COMPLETED | OUTPATIENT
Start: 2021-02-08 | End: 2021-02-08

## 2021-02-08 RX ORDER — IOPAMIDOL 755 MG/ML
500 INJECTION, SOLUTION INTRAVASCULAR ONCE
Status: COMPLETED | OUTPATIENT
Start: 2021-02-08 | End: 2021-02-08

## 2021-02-08 RX ORDER — NALOXONE HYDROCHLORIDE 0.4 MG/ML
0.2 INJECTION, SOLUTION INTRAMUSCULAR; INTRAVENOUS; SUBCUTANEOUS
Status: DISCONTINUED | OUTPATIENT
Start: 2021-02-08 | End: 2021-02-10 | Stop reason: HOSPADM

## 2021-02-08 RX ORDER — SODIUM CHLORIDE 9 MG/ML
INJECTION, SOLUTION INTRAVENOUS CONTINUOUS
Status: DISCONTINUED | OUTPATIENT
Start: 2021-02-08 | End: 2021-02-10

## 2021-02-08 RX ORDER — HYDROMORPHONE HYDROCHLORIDE 1 MG/ML
.3-.5 INJECTION, SOLUTION INTRAMUSCULAR; INTRAVENOUS; SUBCUTANEOUS
Status: DISCONTINUED | OUTPATIENT
Start: 2021-02-08 | End: 2021-02-10 | Stop reason: HOSPADM

## 2021-02-08 RX ADMIN — SODIUM CHLORIDE 1000 ML: 9 INJECTION, SOLUTION INTRAVENOUS at 10:45

## 2021-02-08 RX ADMIN — ONDANSETRON 4 MG: 2 INJECTION INTRAMUSCULAR; INTRAVENOUS at 10:49

## 2021-02-08 RX ADMIN — METHYLPREDNISOLONE SODIUM SUCCINATE 20 MG: 40 INJECTION, POWDER, FOR SOLUTION INTRAMUSCULAR; INTRAVENOUS at 13:04

## 2021-02-08 RX ADMIN — IOPAMIDOL 70 ML: 755 INJECTION, SOLUTION INTRAVENOUS at 11:57

## 2021-02-08 RX ADMIN — HYDROMORPHONE HYDROCHLORIDE 0.5 MG: 1 INJECTION, SOLUTION INTRAMUSCULAR; INTRAVENOUS; SUBCUTANEOUS at 11:42

## 2021-02-08 RX ADMIN — METHYLPREDNISOLONE SODIUM SUCCINATE 20 MG: 40 INJECTION, POWDER, FOR SOLUTION INTRAMUSCULAR; INTRAVENOUS at 19:29

## 2021-02-08 RX ADMIN — HYDROMORPHONE HYDROCHLORIDE 0.5 MG: 1 INJECTION, SOLUTION INTRAMUSCULAR; INTRAVENOUS; SUBCUTANEOUS at 10:49

## 2021-02-08 RX ADMIN — SODIUM CHLORIDE 57 ML: 9 INJECTION, SOLUTION INTRAVENOUS at 11:58

## 2021-02-08 RX ADMIN — SODIUM CHLORIDE: 9 INJECTION, SOLUTION INTRAVENOUS at 16:08

## 2021-02-08 ASSESSMENT — ENCOUNTER SYMPTOMS
VOMITING: 1
ABDOMINAL PAIN: 1
CHILLS: 0
DIARRHEA: 1
FEVER: 0
BLOOD IN STOOL: 0

## 2021-02-08 ASSESSMENT — MIFFLIN-ST. JEOR: SCORE: 1620.88

## 2021-02-08 NOTE — ED NOTES
[facility]  ED Nurse Handoff Report    Romina Dave is a 20 year old male   ED Chief complaint: Abdominal Pain and Diarrhea  . ED Diagnosis:   Final diagnoses:   None     Allergies: No Known Allergies    Code Status: Full Code  Activity level - Baseline/Home:  Independent. Activity Level - Current:   Independent. Lift room needed: No. Bariatric: No   Needed: No   Isolation: No. Infection: Not Applicable.     Vital Signs:   Vitals:    02/08/21 1100 02/08/21 1115 02/08/21 1130 02/08/21 1145   BP: 116/75 113/72 120/70 111/65   Pulse: 77 60 70 65   Resp:       Temp:       TempSrc:       SpO2: 100% 99%  100%   Weight:           Cardiac Rhythm:  ,      Pain level:    Patient confused: No. Patient Falls Risk: Yes.   Elimination Status: Has voided   Patient Report - Initial Complaint: abdominal pain. Focused Assessment:  20 year old male with a history of recently diagnosed Chron's disease on Remicade and iron infusions who presents for evaluation of abdominal pain, vomiting, and diarrhea. Last week, he started having diarrhea and vomiting with progressively worsening abdominal pain. He denies any blood in stool, fever, or chills. He was diagnosed with Chron's 11/20/20 during admission at [facility] and has been following with [facility] since.      Review of Systems   Constitutional: Negative for chills and fever.   Gastrointestinal: Positive for abdominal pain, diarrhea and vomiting. Negative for blood in stool  Tests Performed: labs, imaing. Abnormal Results:   Labs Ordered and Resulted from Time of ED Arrival Up to the Time of Departure from the ED   CBC WITH PLATELETS DIFFERENTIAL - Abnormal; Notable for the following components:       Result Value    RBC Count 6.30 (*)     MCH 24.6 (*)     MCHC 30.8 (*)     RDW 23.9 (*)     All other components within normal limits   BASIC METABOLIC PANEL - Abnormal; Notable for the following components:    Chloride 110 (*)     All other components within  normal limits   CRP INFLAMMATION - Abnormal; Notable for the following components:    CRP Inflammation 15.1 (*)     All other components within normal limits   ERYTHROCYTE SEDIMENTATION RATE AUTO   SARS-COV-2 (COVID-19) VIRUS RT-PCR     CT Abdomen Pelvis w Contrast   Preliminary Result   IMPRESSION:    1.  Moderate bowel wall thickening and mucosal hyperenhancement   involving an approximately 10 cm segment of terminal ileum is   consistent with active Crohn's inflammation.   2.  There is an associated distal small bowel obstruction, with   multiple prominently dilated loops of mid and distal small bowel   noted.   3.  Small to moderate amount of free fluid in the pelvis is likely   related to the terminal ileum inflammation and associated distal small   bowel obstruction.         Treatments provided: See MAR  Family Comments: none  OBS brochure/video discussed/provided to patient:  No  ED Medications:   Medications   ondansetron (ZOFRAN) injection 4 mg (4 mg Intravenous Given 2/8/21 1049)   HYDROmorphone (PF) (DILAUDID) injection 0.5 mg (0.5 mg Intravenous Given 2/8/21 1142)   0.9% sodium chloride BOLUS (0 mLs Intravenous Stopped 2/8/21 1135)     Followed by   sodium chloride 0.9% infusion (has no administration in time range)   iopamidol (ISOVUE-370) solution 500 mL (70 mLs Intravenous Given 2/8/21 1157)   sodium chloride 0.9 % bag 500mL for CT scan flush use (57 mLs As instructed Given 2/8/21 1158)     Drips infusing:  No  For the majority of the shift, the patient's behavior Green. Interventions performed were na.    Sepsis treatment initiated: No     Patient tested for COVID 19 prior to admission: YES    ED Nurse Name/Phone Number: Regi Espinosa RN,   12:35 PM  RECEIVING UNIT ED HANDOFF REVIEW    Above ED Nurse Handoff Report was reviewed: Yes  Reviewed by: Courtney Chambers RN on February 8, 2021 at 1:48 PM

## 2021-02-08 NOTE — ED TRIAGE NOTES
Patient reports history of Crohn's disease, abdominal pain for about a week, diarrhea and vomiting last week. Denies fever/chills.

## 2021-02-08 NOTE — ED PROVIDER NOTES
History   Chief Complaint:  Abdominal Pain and Diarrhea     HPI   Romina Dave is a 20 year old male with a history of recently diagnosed Chron's disease on Remicade and iron infusions who presents for evaluation of abdominal pain, vomiting, and diarrhea. Last week, he started having diarrhea and vomiting with progressively worsening abdominal pain. He denies any blood in stool, fever, or chills. He was diagnosed with Chron's 11/20/20 during admission at [facility] and has been following with [facility] since.     Review of Systems   Constitutional: Negative for chills and fever.   Gastrointestinal: Positive for abdominal pain, diarrhea and vomiting. Negative for blood in stool.   All other systems reviewed and are negative.    Allergies:  The patient has no known allergies.     Medications:  Prednisone    Past Medical History:    Ileocolitis  Crohn's disease  Anemia     Social History:  The patient arrived to the ED Alone via private car.  Marital status: Single  Smoking Status: Never Smoker    Physical Exam     Patient Vitals for the past 24 hrs:   BP Temp Temp src Pulse Resp SpO2 Weight   02/08/21 1145 111/65 -- -- 65 -- 100 % --   02/08/21 1130 120/70 -- -- 70 -- -- --   02/08/21 1115 113/72 -- -- 60 -- 99 % --   02/08/21 1100 116/75 -- -- 77 -- 100 % --   02/08/21 1045 123/76 -- -- 59 -- -- --   02/08/21 1030 123/75 -- -- -- -- -- --   02/08/21 1024 113/65 97.5  F (36.4  C) Temporal 67 16 100 % 62.6 kg (138 lb)       Physical Exam    General: Patient is alert and interactive when I enter the room  Head:  The scalp, face, and head appear normal  Eyes:  Conjunctivae are normal  ENT:    The nose is normal    Pinnae are normal    External acoustic canals are normal  Neck:  Trachea midline  CV:  Pulses are normal    Resp:  No respiratory distress   Abdomen:      Soft, diffusely tender, no guarding, non-distended  Musc:  Normal muscular tone    No major joint effusions    No asymmetric leg swelling  Skin:  No rash  or lesions noted  Neuro:  Speech is normal and fluent. Face is symmetric.     Moving all extremities well.   Psych: Awake. Alert.  Normal affect.  Appropriate interactions.    Emergency Department Course     Imaging:  CT Abdomen Pelvis with Contrast  1.  Moderate bowel wall thickening and mucosal hyperenhancement   involving an approximately 10 cm segment of terminal ileum is   consistent with active Crohn's inflammation.   2.  There is an associated distal small bowel obstruction, with   multiple prominently dilated loops of mid and distal small bowel   noted.   3.  Small to moderate amount of free fluid in the pelvis is likely   related to the terminal ileum inflammation and associated distal small   bowel obstruction.    Reading per radiology.    Laboratory:  CBC: WBC: 7.3, HGB: 15.5, PLT: 340    BMP: Chloride: 110 (H), o/w WNL (Glucose 82, Creatinine: 1.17)    CRP inflammation: 15.1 (H)    Erythrocyte sedimentation rate auto: 6    Asymptomatic COVID-19 PCR: Pending     Emergency Department Course:    Reviewed:  I reviewed the patient's nursing notes, vitals and past medical history.     Assessments:  1033 I performed an exam of the patient in room ED16 as documented above.  1225 I updated the patient on results and discussed plan of care.    Consults:    1242 I spoke with Dr. Jean of the hospitalist service regarding patient's presentation, findings, and plan of care.    Interventions:  1142 Dilaudid, 0.5 mg, IV   1045 NS, 1 L, IV  1049 Zofran, 4 mg, IV  1049 Dilaudid, 0.5 mg, IV     Disposition:  The patient was admitted to the hospital under the care of Dr. Jean.     Impression & Plan   Medical Decision Making:  Romina Dave is a 20 year old male who presents to the emergency department today for evaluation of abdominal pain.  Patient has history of Crohn's disease and is on Remicade.  Patient was quite uncomfortable with his abdominal exam so blood work and a CAT scan was ordered.  His blood work was actually  fairly unremarkable with only mild elevation of his inflammatory markers.  His CT abdomen revealed a small bowel obstruction as well as acute Crohn's flare.  Given this we admit him to the hospital.  Patient has had no vomiting here so no indication for NG.  Discussed the case with hospitalist who ordered steroids.  Patient admitted to the hospital in stable condition.      Covid-19  Romina Dave was evaluated during a global COVID-19 pandemic, which necessitated consideration that the patient might be at risk for infection with the SARS-CoV-2 virus that causes COVID-19.   Applicable protocols for evaluation were followed during the patient's care.   COVID-19 was considered as part of the patient's evaluation. The plan for testing is:  a test was obtained during this visit.    Diagnosis:    ICD-10-CM    1. Crohn's disease of small intestine with complication (H)  K50.019 Asymptomatic SARS-CoV-2 COVID-19 Virus (Coronavirus) by PCR     Scribe Disclosure:  BETY, Emily Roth, am serving as a scribe at 10:42 AM on 2/8/2021 to document services personally performed by Erica Javier MD based on my observations and the provider's statements to me     This note was completed in part using Dragon voice recognition software. Although reviewed after completion, some word and grammatical errors may occur.      Erica Javier MD  02/08/21 3111

## 2021-02-08 NOTE — H&P
[facility]  Hospitalist Admission Note  February 8, 2021  Name: Romina Dave    MRN: [id number]  YOB: 2000    Age: 20 year old  Date of admission: 2/8/2021  Primary care provider: No Ref-Primary, Physician      Summary:  Patient is a 20-year-old male with a history of Crohn's disease which was diagnosed relatively recently in November 2020 and was hospitalized here who now presents here with progressive abdominal pain with nausea and vomiting.  Back in November, who presented with abdominal pain and nausea and had CT radiographic findings concerning for Crohn's disease.  There was also some concerns about possible intra-abdominal abscess.  He was seen by GI and colorectal surgery at that time who agreed with the diagnosis of Crohn's disease.  They elected on nonoperative management of his Crohn's disease despite the concerns for possible intra-abdominal associated abscess although he clinically improved with IV antibiotics.  He was discharged at that time with a prolonged steroid taper as well as p.o. Augmentin.  He did follow-up with [facility].  Since then, he had a colonoscopy done last month and has been started on Remicade infusion.  He believes he has had 2 sessions.  He stopped his steroid TURP for after his colonoscopy as instructed by .  He was also was iron deficient during the previous admission and has been getting outpatient IV iron infusions also.      In any event, patient reports that he started developed some abdominal discomfort and cramping about a week ago.  He routinely works out from Friday to Tuesdays and last Monday after his workout, he did feel quite hungry.  He thought he may have overindulged too much after his workout and may have eaten too much causing him to be nauseated and vomited.  He felt a bit crampy and constipated so he took some over-the-counter laxatives but only ended up with subsequent nausea and vomiting.  Since  that time, he has developed some progressive intermittent abdominal cramping with associated nausea.  He thought it might have been musculoskeletal in nature given the timing of his work-up but did realize that he was unable to participate in working out this past weekend with the hopes that his symptoms would improve if this was musculoskeletal in etiology but today, still had persistent inability to tolerate any significant p.o. intake and had been having worsening and more frequent abdominal cramping and pain so he presented here for further evaluation.    On work-up in the ED, labs are fairly unremarkable with.  His hemoglobin has much improved to 15.5.  COVID-19 screen was negative.  CT of the abdomen did demonstrate a 10 cm segment of terminal ileum bowel wall thickening with associated distal small bowel dilatation consistent with a bowel obstruction.  He does report that he has been having diarrhea ever since his symptoms started.  Nonbloody however in nature.  He did get some relief from the intervention in the ED and I was asked to admit him for further inpatient cares.      Active medical issues    Acute exacerbation of Crohn's disease with associated distal small bowel obstruction  -Inpatient admission for further cares  -Bowel rest; n.p.o. for now with the exception of ice chips  -We will offer maintenance IV fluids with as needed antiemetics and pain control  -We will start patient on Solu-Medrol 20 mg IV every 6 hours or as directed by GI  -Formal [facility] consultation to assist in following and further management  -Consider NG tube placement if intractable nausea and pain is persistent despite conservative management        -Additional workup plan which will include GI consultation    All lab work and imaging data independently reviewed by myself    Prophylaxis;  Low risk/Ambulation.     Code status: Full code    Discharge: Back to usual prior living arrangement    Chief Complaint:   Abdominal  pain/nausea/vomiting   HPI  Patient is a 20-year-old male with a history of Crohn's disease which was diagnosed relatively recently in November 2020 and was hospitalized here who now presents here with progressive abdominal pain with nausea and vomiting.  Back in November, who presented with abdominal pain and nausea and had CT radiographic findings concerning for Crohn's disease.  There was also some concerns about possible intra-abdominal abscess.  He was seen by GI and colorectal surgery at that time who agreed with the diagnosis of Crohn's disease.  They elected on nonoperative management of his Crohn's disease despite the concerns for possible intra-abdominal associated abscess although he clinically improved with IV antibiotics.  He was discharged at that time with a prolonged steroid taper as well as p.o. Augmentin.  He did follow-up with [facility].  Since then, he had a colonoscopy done last month and has been started on Remicade infusion.  He believes he has had 2 sessions.  He stopped his steroid TURP for after his colonoscopy as instructed by .  He was also was iron deficient during the previous admission and has been getting outpatient IV iron infusions also.      In any event, patient reports that he started developed some abdominal discomfort and cramping about a week ago.  He routinely works out from Friday to Tuesdays and last Monday after his workout, he did feel quite hungry.  He thought he may have overindulged too much after his workout and may have eaten too much causing him to be nauseated and vomited.  He felt a bit crampy and constipated so he took some over-the-counter laxatives but only ended up with subsequent nausea and vomiting.  Since that time, he has developed some progressive intermittent abdominal cramping with associated nausea.  He thought it might have been musculoskeletal in nature given the timing of his work-up but did realize that he was unable to participate in working  "out this past weekend with the hopes that his symptoms would improve if this was musculoskeletal in etiology but today, still had persistent inability to tolerate any significant p.o. intake and had been having worsening and more frequent abdominal cramping and pain so he presented here for further evaluation.    On work-up in the ED, labs are fairly unremarkable with.  His hemoglobin has much improved to 15.5.  COVID-19 screen was negative.  CT of the abdomen did demonstrate a 10 cm segment of terminal ileum bowel wall thickening with associated distal small bowel dilatation consistent with a bowel obstruction.  He does report that he has been having diarrhea ever since his symptoms started.  Nonbloody however in nature.  He did get some relief from the intervention in the ED and I was asked to admit him for further inpatient cares.    I did discuss the case in detail with the ED physician.     Past Medical History:   Crohn's disease, recently diagnosed in November 2020  Past Surgical History:   No past surgical history on file.  Social History: Single, non-smoker       Family History:  Family history reviewed. NO pertinent family history     Allergies:   No Known Allergies  Medications:     Medications Prior to Admission   Medication Sig Dispense Refill Last Dose     inFLIXimab (REMICADE IV) Every four weeks   Past Month at Unknown time     Multiple Vitamins-Minerals (MULTIVITAMIN GUMMIES ADULT PO) Take 1 chew tab by mouth daily   2/7/2021 at Unknown time     UNABLE TO FIND MEDICATION NAME: Iron infusion, every four weeks.   Past Month at Unknown time       Review of Systems:   A Comprehensive greater than 10 system review of systems was carried out.  Pertinent positives and negatives are noted above.  Otherwise negative for contributory information.        Physical Exam:  Blood pressure 101/60, pulse 69, temperature 97.8  F (36.6  C), temperature source Oral, resp. rate 16, height 1.803 m (5' 11\"), weight 59.9 kg " (132 lb), SpO2 100 %.  Gen: Pleasant in no acute distress.  HEENT: NCAT. EOMI. PERRL.  Neck: Normal inspection. No bruit, JVD or thyromegaly.  Lungs: Normal respiratory effort. Clear to auscultation bilaterally with no crackles or wheezes.  Card: N s1s2. RRR. No M/R/G.  Peripheral pulses present and symmetric.   Abd: Soft nondistended but generalized tenderness with palpation.  Bowel sounds are hypoactive.  No mass.    Skin: No rash. Warm to the touch  Extr: No edema. CMS intact  Psychiatric: Patient alert oriented ×3.  Normal affect  Neurologic: Cranial nerves II-XII are intact.  Sensation normal.  Motor strength 5/5    Data:     Recent Labs   Lab 02/08/21  1046   WBC 7.3   HGB 15.5   HCT 50.3   MCV 80        Recent Labs   Lab 02/08/21  1046      POTASSIUM 4.1   CHLORIDE 110*   CO2 25   ANIONGAP 3   GLC 82   BUN 11   CR 1.17   GFRESTIMATED 88   GFRESTBLACK >90   LUCIANO 9.2     No results for input(s): INR in the last 168 hours.    Imaging:   Recent Results (from the past 24 hour(s))   CT Abdomen Pelvis w Contrast    Narrative    CT ABDOMEN/PELVIS WITH CONTRAST February 8, 2021 12:10 PM    CLINICAL HISTORY: Acute generalized abdominal pain.    TECHNIQUE: CT scan of the abdomen and pelvis was performed following  injection of IV contrast. Multiplanar reformats were obtained. Dose  reduction techniques were used.  CONTRAST: 70mL Isovue-370.    COMPARISON: CT of the abdomen and pelvis performed 11/14/2020.    FINDINGS:   LOWER CHEST: The visualized lung bases are clear.    HEPATOBILIARY: Unremarkable. No hepatic masses are seen.    PANCREAS: Normal.    SPLEEN: Normal.    ADRENAL GLANDS: Normal.    KIDNEYS/BLADDER: Unremarkable. No hydronephrosis.    BOWEL: Moderate bowel wall thickening and mucosal hyperenhancement  involving an approximately 10 cm segment of terminal ileum is  consistent with active Crohn's inflammation. Just proximal to this  thickened loop of terminal ileum, there are multiple  prominently  dilated loops of mid and distal small bowel, consistent with a small  bowel obstruction. A transition point is noted in the right lower  quadrant (series 5 image 53; series 4 image 171). The colon is largely  decompressed. Unremarkable appendix.    PELVIC ORGANS: Unremarkable.    LYMPH NODES: No enlarged lymph nodes are identified in the abdomen or  pelvis.    VASCULATURE: Unremarkable.    ADDITIONAL FINDINGS: There is a small to moderate amount of free fluid  in the pelvis, likely related to the terminal ileum, inflammation and  associated distal small bowel obstruction. No discrete fluid  collections to suggest abscess. No free intraperitoneal air is  identified.    MUSCULOSKELETAL: Unremarkable.      Impression    IMPRESSION:   1.  Moderate bowel wall thickening and mucosal hyperenhancement  involving an approximately 10 cm segment of terminal ileum, consistent  with active Crohn's inflammation.  2.  There is an associated distal small bowel obstruction, with  multiple prominently dilated loops of mid and distal small bowel  noted.  3.  Small to moderate amount of free fluid in the pelvis is likely  related to the terminal ileum inflammation and associated distal small  bowel obstruction.    MD Nola VERDE MD Pager [phone]

## 2021-02-08 NOTE — PHARMACY-ADMISSION MEDICATION HISTORY
Admission medication history interview status for this patient is complete. See [facility] admission navigator for allergy information, prior to admission medications and immunization status.     Medication history interview done via telephone during Covid-19 pandemic, indicate source(s): Patient  Medication history resources (including written lists, pill bottles, clinic record):None  Pharmacy: [facility] in [place]    Changes made to PTA medication list:  Added: Remicade and iron infusion  Deleted: prednisone  Changed: multivitamin from chew tablet to gummy.      Actions taken by pharmacist (provider contacted, etc):None     Additional medication history information:None    Medication reconciliation/reorder completed by provider prior to medication history?  Y   (Y/N)        Prior to Admission medications    Medication Sig Last Dose Taking? Auth Provider   inFLIXimab (REMICADE IV) Every four weeks Past Month at Unknown time Yes Unknown, Entered By History   Multiple Vitamins-Minerals (MULTIVITAMIN GUMMIES ADULT PO) Take 1 chew tab by mouth daily 2/7/2021 at Unknown time Yes Unknown, Entered By History   UNABLE TO FIND MEDICATION NAME: Iron infusion, every four weeks. Past Month at Unknown time Yes Unknown, Entered By History

## 2021-02-09 LAB
ANION GAP SERPL CALCULATED.3IONS-SCNC: 5 MMOL/L (ref 3–14)
BUN SERPL-MCNC: 10 MG/DL (ref 7–30)
CALCIUM SERPL-MCNC: 7.7 MG/DL (ref 8.5–10.1)
CHLORIDE SERPL-SCNC: 110 MMOL/L (ref 94–109)
CO2 SERPL-SCNC: 24 MMOL/L (ref 20–32)
CREAT SERPL-MCNC: 0.91 MG/DL (ref 0.66–1.25)
ERYTHROCYTE [DISTWIDTH] IN BLOOD BY AUTOMATED COUNT: 23.6 % (ref 10–15)
GFR SERPL CREATININE-BSD FRML MDRD: >90 ML/MIN/{1.73_M2}
GLUCOSE SERPL-MCNC: 112 MG/DL (ref 70–99)
HCT VFR BLD AUTO: 42.7 % (ref 40–53)
HGB BLD-MCNC: 13.2 G/DL (ref 13.3–17.7)
MCH RBC QN AUTO: 24.7 PG (ref 26.5–33)
MCHC RBC AUTO-ENTMCNC: 30.9 G/DL (ref 31.5–36.5)
MCV RBC AUTO: 80 FL (ref 78–100)
PLATELET # BLD AUTO: 287 10E9/L (ref 150–450)
POTASSIUM SERPL-SCNC: 4.3 MMOL/L (ref 3.4–5.3)
RBC # BLD AUTO: 5.34 10E12/L (ref 4.4–5.9)
SODIUM SERPL-SCNC: 139 MMOL/L (ref 133–144)
WBC # BLD AUTO: 6.5 10E9/L (ref 4–11)

## 2021-02-09 PROCEDURE — 85027 COMPLETE CBC AUTOMATED: CPT | Performed by: INTERNAL MEDICINE

## 2021-02-09 PROCEDURE — 120N000004 HC R&B MS OVERFLOW

## 2021-02-09 PROCEDURE — 258N000003 HC RX IP 258 OP 636: Performed by: INTERNAL MEDICINE

## 2021-02-09 PROCEDURE — 250N000011 HC RX IP 250 OP 636: Performed by: INTERNAL MEDICINE

## 2021-02-09 PROCEDURE — 99232 SBSQ HOSP IP/OBS MODERATE 35: CPT | Performed by: INTERNAL MEDICINE

## 2021-02-09 PROCEDURE — 80048 BASIC METABOLIC PNL TOTAL CA: CPT | Performed by: INTERNAL MEDICINE

## 2021-02-09 PROCEDURE — 36415 COLL VENOUS BLD VENIPUNCTURE: CPT | Performed by: INTERNAL MEDICINE

## 2021-02-09 RX ORDER — PREDNISONE 20 MG/1
40 TABLET ORAL DAILY
Status: DISCONTINUED | OUTPATIENT
Start: 2021-02-10 | End: 2021-02-10 | Stop reason: HOSPADM

## 2021-02-09 RX ADMIN — METHYLPREDNISOLONE SODIUM SUCCINATE 20 MG: 40 INJECTION, POWDER, FOR SOLUTION INTRAMUSCULAR; INTRAVENOUS at 14:41

## 2021-02-09 RX ADMIN — METHYLPREDNISOLONE SODIUM SUCCINATE 20 MG: 40 INJECTION, POWDER, FOR SOLUTION INTRAMUSCULAR; INTRAVENOUS at 02:10

## 2021-02-09 RX ADMIN — SODIUM CHLORIDE: 9 INJECTION, SOLUTION INTRAVENOUS at 15:43

## 2021-02-09 RX ADMIN — METHYLPREDNISOLONE SODIUM SUCCINATE 20 MG: 40 INJECTION, POWDER, FOR SOLUTION INTRAMUSCULAR; INTRAVENOUS at 09:07

## 2021-02-09 NOTE — PROGRESS NOTES
[facility]             Hospitalist Progress Note    Name: Romina Dave    MRN: [id number]  YOB: 2000    Age: 20 year old  Date of admission: 2/8/2021  Primary care provider: No Ref-Primary, Physician      Reason for Stay (Diagnosis): crohn's flare with sbo         Assessment and Plan:      Summary of Stay:  Patient is a 20-year-old male with a history of Crohn's disease which was diagnosed relatively recently in November 2020 and was hospitalized here who now presents here with progressive abdominal pain with nausea and vomiting.  Back in November, who presented with abdominal pain and nausea and had CT radiographic findings concerning for Crohn's disease.  There was also some concerns about possible intra-abdominal abscess.  He was seen by GI and colorectal surgery at that time who agreed with the diagnosis of Crohn's disease.  They elected on nonoperative management of his Crohn's disease despite the concerns for possible intra-abdominal associated abscess although he clinically improved with IV antibiotics.  He was discharged at that time with a prolonged steroid taper as well as p.o. Augmentin.  He did follow-up with [facility].  Since then, he had a colonoscopy done last month and has been started on Remicade infusion.  He believes he has had 2 sessions.  He stopped his steroid taper for after his colonoscopy as instructed by GI.  He was also was iron deficient during the previous admission and has been getting outpatient IV iron infusions also.       In any event, patient reports that he started developed some abdominal discomfort and cramping about a week ago.  He routinely works out from Friday to Tuesdays and last Monday after his workout, he did feel quite hungry.  He thought he may have overindulged too much after his workout and may have eaten too much causing him to be nauseated and vomited.  He felt a bit crampy and constipated so he took some over-the-counter  laxatives but only ended up with subsequent nausea and vomiting.  Since that time, he has developed some progressive intermittent abdominal cramping with associated nausea.  He thought it might have been musculoskeletal in nature given the timing of his work-up but did realize that he was unable to participate in working out this past weekend with the hopes that his symptoms would improve if this was musculoskeletal in etiology but today, still had persistent inability to tolerate any significant p.o. intake and had been having worsening and more frequent abdominal cramping and pain so he presented here for further evaluation.     On work-up in the ED, labs are fairly unremarkable with.  His hemoglobin has much improved to 15.5.  COVID-19 screen was negative.  CT of the abdomen did demonstrate a 10 cm segment of terminal ileum bowel wall thickening with associated distal small bowel dilatation consistent with a bowel obstruction.  He does report that he has been having diarrhea ever since his symptoms started.  Nonbloody however in nature.  He did get some relief from the intervention in the ED and I was asked to admit him for further inpatient cares.       Active medical issues     Acute exacerbation of Crohn's disease with associated distal small bowel obstruction   Pain improved and non-surgical abd.  -start clear liquids and advance if ok with GI  -await formal gi input  -cont iv solumedrol  -note next scheduled dose of remicaide is next Friday    DVT Prophylaxis: Low Risk/Ambulatory with no VTE prophylaxis indicated  Code Status: Full Code  Discharge Dispo: home  Estimated Disch Date / # of Days until Disch: 1-2 days if crohn's flare improves and able to adv diet          Interval History (Subjective):      No pain overnight; slept well; denies nausea and feels hungry         Physical Exam:      Vital signs:  Temp: 97.7  F (36.5  C) Temp src: Oral BP: 107/77 Pulse: 71   Resp: 16 SpO2: 100 % O2 Device: None  "(Room air)   Height: 180.3 cm (5' 11\") Weight: 59.9 kg (132 lb)  Estimated body mass index is 18.41 kg/m  as calculated from the following:    Height as of this encounter: 1.803 m (5' 11\").    Weight as of this encounter: 59.9 kg (132 lb).    No intake/output data recorded.  Vitals:    02/08/21 1024 02/08/21 1400   Weight: 62.6 kg (138 lb) 59.9 kg (132 lb)       Constitutional: Awake, alert, cooperative, no apparent distress             Abd Soft; generalized tenderness but no r/g nbs.   Skin: No rashes, no cyanosis, dry to touch   Neuro: CN 2-12 intact, no localizing weakness   Extremities: No edema, normal range of motion   HEENT Normocephalic, atraumatic, normal nasal turbinates; oropharynx clear   Neck Supple; nl inspection; trachea midline; no thryomegaly   Psychiatric: A+O x3. Normal affect          Medications:      All current medications were reviewed with changes reflected in problem list.         Data:      All new lab and imaging data was reviewed.   Labs:  No results for input(s): CULT in the last 168 hours.  Recent Labs   Lab 02/09/21  0550 02/08/21  1046   WBC 6.5 7.3   HGB 13.2* 15.5   HCT 42.7 50.3   MCV 80 80    340     Recent Labs   Lab 02/09/21  0550 02/08/21  1046    138   POTASSIUM 4.3 4.1   CHLORIDE 110* 110*   CO2 24 25   ANIONGAP 5 3   * 82   BUN 10 11   CR 0.91 1.17   GFRESTIMATED >90 88   GFRESTBLACK >90 >90   LUCIANO 7.7* 9.2      Imaging:   Recent Results (from the past 24 hour(s))   CT Abdomen Pelvis w Contrast    Narrative    CT ABDOMEN/PELVIS WITH CONTRAST February 8, 2021 12:10 PM    CLINICAL HISTORY: Acute generalized abdominal pain.    TECHNIQUE: CT scan of the abdomen and pelvis was performed following  injection of IV contrast. Multiplanar reformats were obtained. Dose  reduction techniques were used.  CONTRAST: 70mL Isovue-370.    COMPARISON: CT of the abdomen and pelvis performed 11/14/2020.    FINDINGS:   LOWER CHEST: The visualized lung bases are " clear.    HEPATOBILIARY: Unremarkable. No hepatic masses are seen.    PANCREAS: Normal.    SPLEEN: Normal.    ADRENAL GLANDS: Normal.    KIDNEYS/BLADDER: Unremarkable. No hydronephrosis.    BOWEL: Moderate bowel wall thickening and mucosal hyperenhancement  involving an approximately 10 cm segment of terminal ileum is  consistent with active Crohn's inflammation. Just proximal to this  thickened loop of terminal ileum, there are multiple prominently  dilated loops of mid and distal small bowel, consistent with a small  bowel obstruction. A transition point is noted in the right lower  quadrant (series 5 image 53; series 4 image 171). The colon is largely  decompressed. Unremarkable appendix.    PELVIC ORGANS: Unremarkable.    LYMPH NODES: No enlarged lymph nodes are identified in the abdomen or  pelvis.    VASCULATURE: Unremarkable.    ADDITIONAL FINDINGS: There is a small to moderate amount of free fluid  in the pelvis, likely related to the terminal ileum, inflammation and  associated distal small bowel obstruction. No discrete fluid  collections to suggest abscess. No free intraperitoneal air is  identified.    MUSCULOSKELETAL: Unremarkable.      Impression    IMPRESSION:   1.  Moderate bowel wall thickening and mucosal hyperenhancement  involving an approximately 10 cm segment of terminal ileum, consistent  with active Crohn's inflammation.  2.  There is an associated distal small bowel obstruction, with  multiple prominently dilated loops of mid and distal small bowel  noted.  3.  Small to moderate amount of free fluid in the pelvis is likely  related to the terminal ileum inflammation and associated distal small  bowel obstruction.    MD Nola VERDE [phone]

## 2021-02-09 NOTE — PLAN OF CARE
"/58   Pulse 68   Temp 97.9  F (36.6  C) (Oral)   Resp 14   Ht 1.803 m (5' 11\")   Wt 59.9 kg (132 lb)   SpO2 100%   BMI 18.41 kg/m    Neuro: WDL  Cardiac: WDL  Lungs: WDL  GI: WDL.  : No stool overnight.   Pain: No pain noted as patient appeared to sleep comfortably all night.  IV: NS @ 125 ml per hour  Meds: Methylprednisilone every 6 hours  Labs/tests: N/A  Diet: NPO except ice chips  Activity: Independent  Misc: N/A  Plan: Continue to monitor and manage abdominal pain.  Provide steriods and fluids per orders.     Patient is stable and on room air.  Independent, on a NPO diet. Afebrile.  Will continue to monitor and provide cares.              "

## 2021-02-09 NOTE — PLAN OF CARE
VSS, A/O. Independent. Ls-clear.  Pt started on clears, tolerating well.  Normoactive bowel sounds, bowel movement today per pt with blood noted in formed part of stool.  Pt described as as bright red/orange color in stool. NS infusing @ 100ml/h.  IV methylprednisolone q6h. Discharge 1-2 days if improves and is able to advance diet.

## 2021-02-10 VITALS
HEART RATE: 74 BPM | BODY MASS INDEX: 18.48 KG/M2 | HEIGHT: 71 IN | RESPIRATION RATE: 16 BRPM | DIASTOLIC BLOOD PRESSURE: 60 MMHG | SYSTOLIC BLOOD PRESSURE: 98 MMHG | WEIGHT: 132 LBS | TEMPERATURE: 97.8 F | OXYGEN SATURATION: 100 %

## 2021-02-10 PROCEDURE — 250N000012 HC RX MED GY IP 250 OP 636 PS 637: Performed by: INTERNAL MEDICINE

## 2021-02-10 PROCEDURE — 99238 HOSP IP/OBS DSCHRG MGMT 30/<: CPT | Performed by: INTERNAL MEDICINE

## 2021-02-10 PROCEDURE — 258N000003 HC RX IP 258 OP 636: Performed by: INTERNAL MEDICINE

## 2021-02-10 RX ORDER — PREDNISONE 10 MG/1
TABLET ORAL
Qty: 46 TABLET | Refills: 0 | Status: ON HOLD | OUTPATIENT
Start: 2021-02-11 | End: 2021-03-05

## 2021-02-10 RX ORDER — ONDANSETRON 4 MG/1
4 TABLET, ORALLY DISINTEGRATING ORAL EVERY 8 HOURS PRN
Qty: 10 TABLET | Refills: 0 | Status: ON HOLD | OUTPATIENT
Start: 2021-02-10 | End: 2021-03-05

## 2021-02-10 RX ADMIN — PREDNISONE 40 MG: 20 TABLET ORAL at 08:21

## 2021-02-10 RX ADMIN — SODIUM CHLORIDE: 9 INJECTION, SOLUTION INTRAVENOUS at 00:45

## 2021-02-10 NOTE — PROGRESS NOTES
Pt doing well. maría low fiber. +flatus and no pain. GI input appreciated. Will taper prednisone and pt does have appt next Friday for remicaide infusion. Will discharge home.

## 2021-02-10 NOTE — PLAN OF CARE
VSS.  Denies pain.  BM reported, pt denies any blood.  Tolerating low fiber diet; denies abdominal cramping and nausea.  Voiding.  Up independently in room.  Discharged home to self care.  Discharge instructions given; all questions answered.  Aware of follow up recommendations and already scheduled appointments.

## 2021-02-10 NOTE — DISCHARGE SUMMARY
[facility]       DISCHARGE SUMMARY          Romina Dave MRN# [id number]   YOB: 2000 Age: 20 year old     Date of Admission:  2/8/2021  Date of Discharge:  2/10/2021 12:58 PM  Admitting Physician:  Nola Jean MD  Discharge Physician: Nola Jean MD  Discharging Service: Hospitalist     Primary Provider: Trevor Dominguez  Primary Care Physician Phone Number: None         Discharge Diagnoses/Problem Oriented Hospital Course (Providers):      Romina Dave was admitted on 2/8/2021 by Nola Jean MD and I would refer you to their history and physical.      Patient was seen and examined on the day of discharge    Discharge Diagnoses    1. Acute flareup of Crohn's disease with associated distal small bowel obstruction      Hospital Course:    Patient is a 20-year-old male with a history of Crohn's disease which was diagnosed relatively recently in November 2020 and was hospitalized here who now presents here with progressive abdominal pain with nausea and vomiting.  Back in November, he presented with abdominal pain and nausea and had CT radiographic findings concerning for Crohn's disease.  There was also some concerns about possible intra-abdominal abscess.  He was seen by GI and colorectal surgery at that time who agreed with the diagnosis of Crohn's disease.  They elected on nonoperative management of his Crohn's disease despite the concerns for possible intra-abdominal associated abscess although he clinically improved with IV antibiotics.  He was discharged at that time with a prolonged steroid taper as well as p.o. Augmentin.  He did follow-up with [facility].  Since then, he had a colonoscopy done last month and has been started on Remicade infusion.  He believes he has had 2 sessions.  He stopped his steroid TURP for after his colonoscopy as instructed by GI.  He was also was iron deficient during the previous admission and has been getting outpatient IV iron infusions  also.       In any event, patient reports that he started developed some abdominal discomfort and cramping about a week prior to admission.  He routinely works out from Friday to Tuesdays and last Monday after his workout, he did feel quite hungry.  He thought he may have overindulged too much after his workout and may have eaten too much causing him to be nauseated and vomited.  He felt a bit crampy and constipated so he took some over-the-counter laxatives but only ended up with subsequent nausea and vomiting.  Since that time, he has developed some progressive intermittent abdominal cramping with associated nausea.  He thought it might have been musculoskeletal in nature given the timing of his work-up but did realize that he was unable to participate in working out this past weekend with the hopes that his symptoms would improve if this was musculoskeletal in etiology but today, still had persistent inability to tolerate any significant p.o. intake and had been having worsening and more frequent abdominal cramping and pain so he presented here for further evaluation.  In the ED, work-up included a CT of the abdomen pelvis which demonstrated a 10 cm length of edematous ileum consistent with Crohn's flareup with mildly dilated distal small bowel consistent with a small bowel obstruction.  I was asked to admit the patient for further cares.    Patient was admitted and made n.p.o.  IV fluids along with as needed antiemetics and pain control was offered.  Since admission, he continued to pass flatus and pain was controlled with bowel rest.  He was placed on Solu-Medrol 20 mg IV every 6 hours.  On hospital day #2, his pain was controlled and he actually was hungry.  He was started on a clear liquid diet and then advanced to a full liquid diet.  GI was consulted and recommended advancing diet to a low fiber diet.  Patient will be transitioned to p.o. prednisone on the day of discharge with a slow taper as outlined  below.  He does have an appointment with GI for his next Remicade infusion on 2/19/2021.  They will instruct him further on what to do with the steroids and determine whether or not he should still get the Remicade infusion.           Pending Results:        Unresulted Labs Ordered in the Past 30 Days of this Admission     No orders found from 1/9/2021 to 2/9/2021.            Discharge Instructions and Follow-Up:      Follow-up Appointments     Follow-up and recommended labs and tests       Follow up with [facility] next Friday as scheduled for next remicaide   infusion.               Discharge Disposition:        Discharged to home         Discharge Medications:        Discharge Medication List as of 2/10/2021 10:10 AM      START taking these medications    Details   ondansetron (ZOFRAN-ODT) 4 MG ODT tab Take 1 tablet (4 mg) by mouth every 8 hours as needed for nausea, Disp-10 tablet, R-0, E-Prescribe      predniSONE (DELTASONE) 10 MG tablet 40mg x4 days, 30mg x 5 days, 20mg x 5 days, 10mg x 3 days, 5mg (1/2 tab of 10mg tablet) x 3 days., Disp-46 tablet, R-0, E-Prescribe         CONTINUE these medications which have NOT CHANGED    Details   inFLIXimab (REMICADE IV) Every four weeks, Historical      Multiple Vitamins-Minerals (MULTIVITAMIN GUMMIES ADULT PO) Take 1 chew tab by mouth daily, Historical      UNABLE TO FIND MEDICATION NAME: Iron infusion, every four weeks., Historical               Allergies:       No Known Allergies        Consultations This Hospital Stay:        Consultation during this admission received from gastroenterology           Image Results From This Hospital Stay (For Non-EPIC Providers):        Results for orders placed or performed during the hospital encounter of 02/08/21   CT Abdomen Pelvis w Contrast    Narrative    CT ABDOMEN/PELVIS WITH CONTRAST February 8, 2021 12:10 PM    CLINICAL HISTORY: Acute generalized abdominal pain.    TECHNIQUE: CT scan of the abdomen and pelvis was performed  following  injection of IV contrast. Multiplanar reformats were obtained. Dose  reduction techniques were used.  CONTRAST: 70mL Isovue-370.    COMPARISON: CT of the abdomen and pelvis performed 11/14/2020.    FINDINGS:   LOWER CHEST: The visualized lung bases are clear.    HEPATOBILIARY: Unremarkable. No hepatic masses are seen.    PANCREAS: Normal.    SPLEEN: Normal.    ADRENAL GLANDS: Normal.    KIDNEYS/BLADDER: Unremarkable. No hydronephrosis.    BOWEL: Moderate bowel wall thickening and mucosal hyperenhancement  involving an approximately 10 cm segment of terminal ileum is  consistent with active Crohn's inflammation. Just proximal to this  thickened loop of terminal ileum, there are multiple prominently  dilated loops of mid and distal small bowel, consistent with a small  bowel obstruction. A transition point is noted in the right lower  quadrant (series 5 image 53; series 4 image 171). The colon is largely  decompressed. Unremarkable appendix.    PELVIC ORGANS: Unremarkable.    LYMPH NODES: No enlarged lymph nodes are identified in the abdomen or  pelvis.    VASCULATURE: Unremarkable.    ADDITIONAL FINDINGS: There is a small to moderate amount of free fluid  in the pelvis, likely related to the terminal ileum, inflammation and  associated distal small bowel obstruction. No discrete fluid  collections to suggest abscess. No free intraperitoneal air is  identified.    MUSCULOSKELETAL: Unremarkable.      Impression    IMPRESSION:   1.  Moderate bowel wall thickening and mucosal hyperenhancement  involving an approximately 10 cm segment of terminal ileum, consistent  with active Crohn's inflammation.  2.  There is an associated distal small bowel obstruction, with  multiple prominently dilated loops of mid and distal small bowel  noted.  3.  Small to moderate amount of free fluid in the pelvis is likely  related to the terminal ileum inflammation and associated distal small  bowel obstruction.    JHOANA CLEMONS,  MD

## 2021-02-10 NOTE — PLAN OF CARE
Vital signs: Stable; afebrile.   Pain Control: Denying pain.  Diet: Advanced to low fiber diet and tolerating well.   Output: Voiding; no BMs this shift.                Activity: Independent; up ad fide  Updates: Tolerating diet well; denying N/V/D; IV steroids discontinued by Dr. Sharpe; continues on IV fluids.   Plan: Start PO steroids in AM. Hoping to discharge home in AM.     Will continue to monitor and provide for cares.

## 2021-02-10 NOTE — CONSULTS
"  GASTROENTEROLOGY CONSULTATION     Romina Dave  [address]  [place] [facility] [address]  20 year old male    Admission Date/Time: 2/8/2021  Primary Care Provider: Trevor Teague    We were asked to see the patient in consultation by Dr. Jean for evaluation of Crohn's flare.        HPI:  Romina Dave is a 20 year old male with ileocolonic crohn's disease diagnosed November 2020, started remicade treatment 1/4/21, has 3rd induction dose scheduled 2/19/21.  Admitted with  Sbo, ct scan showing 10 cm of TI with wall thickening, started on IV steroids yesterday and reports abdominal pain has now resolved, tolerating full liquid diet.    ROS: A comprehensive ten point review of systems was negative aside from those in mentioned in the HPI.      MEDICATIONS: No current facility-administered medications on file prior to encounter.        inFLIXimab (REMICADE IV), Every four weeks       Multiple Vitamins-Minerals (MULTIVITAMIN GUMMIES ADULT PO), Take 1 chew tab by mouth daily       UNABLE TO FIND, MEDICATION NAME: Iron infusion, every four weeks.        ALLERGIES: No Known Allergies    No past medical history on file.    No past surgical history on file.      SOCIAL HISTORY:  Social History     Tobacco Use     Smoking status: Not on file   Substance Use Topics     Alcohol use: Not on file     Drug use: Not on file       FAMILY HISTORY:  Reviewed in chart    PHYSICAL EXAM:   /53   Pulse 71   Temp 98  F (36.7  C) (Oral)   Resp 12   Ht 1.803 m (5' 11\")   Wt 59.9 kg (132 lb)   SpO2 100%   BMI 18.41 kg/m      Constitutional: NAD, comfortable  Cardiovascular: RRR,  Respiratory: non labored breathing  Psychiatric: mentation appears normal and affect normal/bright  Head: Normocephalic. Atraumatic.    Neck: Neck supple.   Eyes:   no icterus  ENT:  hearing adequate,  Abdomen:   Soft/nt/nd  NEURO: grossly negative  SKIN: no suspicious lesions or rashes          ADDITIONAL COMMENTS:   I reviewed the patient's " new clinical lab test results.   Recent Labs   Lab Test 02/09/21  0550 02/08/21  1046 11/16/20  0643   WBC 6.5 7.3 11.0   HGB 13.2* 15.5 9.5*   MCV 80 80 75*    340 567*     Recent Labs   Lab Test 02/09/21  0550 02/08/21  1046 11/16/20  0643    138 140   POTASSIUM 4.3 4.1 4.3   CHLORIDE 110* 110* 110*   CO2 24 25 29   BUN 10 11 11   CR 0.91 1.17 1.04   ANIONGAP 5 3 1*   LUCIANO 7.7* 9.2 8.3*   * 82 125*     Recent Labs   Lab Test 11/14/20  1046 11/14/20  1039   ALBUMIN 2.8*  --    BILITOTAL 0.4  --    ALT 14  --    AST 12  --    ALKPHOS 68  --    PROTEIN  --  20*   LIPASE 137  --              .    CONSULTATION ASSESSMENT AND PLAN:    Ileocolonic crohn's disease, admitted with sbo secondary to ongoing active TI disease.  Has started remicade induction as outpatient and is due for his third dose on 2/19/21.  SBO symptoms resolved with IV steroids, tolerating full liquid diet.    - change IV steroids to po prednisone.  Will start oral prednisone tomorrow at 40 mg po qday and plan to taper off within a 3 week period. 40 x5 days, 30 x 5 days, 20x 5 days, 10 x 3 days, 5 x 3 days.  - low fiber regular diet today  - if doing well can go home tomorrow.    Nice Charmaine Sharpe MD  [place]

## 2021-02-10 NOTE — PROGRESS NOTES
Orientation: Alert and oriented x4  VSS. 100% on RA. Afebrile.   LS: clear and equal bilaterally.   GI: Passing gas. no BM. Denies N/V. Tolerating low fiber diet.   : Adequate urine output.   Skin: intact no apparent issues  Activity: Independent. Up to bathroom overnight. Pt slept comfortably throughout shift.   Pain: 0/10. Denies pain throughout shift. No PRN interventions.   Plan: Continue with current cares. Start PO prednisone this AM. Hoping to discharge later this morning.

## 2021-02-10 NOTE — PROGRESS NOTES
VSS. Denying pain throughout shift. Continuous IV fluids running. NPO status except ice chips maintained.

## 2021-03-05 ENCOUNTER — APPOINTMENT (OUTPATIENT)
Dept: CT IMAGING | Facility: CLINIC | Age: 21
End: 2021-03-05
Attending: EMERGENCY MEDICINE
Payer: COMMERCIAL

## 2021-03-05 ENCOUNTER — HOSPITAL ENCOUNTER (INPATIENT)
Facility: CLINIC | Age: 21
LOS: 2 days | Discharge: HOME | End: 2021-03-07
Attending: EMERGENCY MEDICINE | Admitting: INTERNAL MEDICINE
Payer: COMMERCIAL

## 2021-03-05 ENCOUNTER — APPOINTMENT (OUTPATIENT)
Dept: GENERAL RADIOLOGY | Facility: CLINIC | Age: 21
End: 2021-03-05
Attending: EMERGENCY MEDICINE
Payer: COMMERCIAL

## 2021-03-05 DIAGNOSIS — K50.814 CROHN'S DISEASE OF BOTH SMALL AND LARGE INTESTINE WITH ABSCESS (H): ICD-10-CM

## 2021-03-05 DIAGNOSIS — K50.019 CROHN'S DISEASE OF SMALL INTESTINE WITH COMPLICATION (H): Primary | ICD-10-CM

## 2021-03-05 LAB
ALBUMIN SERPL-MCNC: 2.9 G/DL (ref 3.4–5)
ALBUMIN UR-MCNC: 20 MG/DL
ALP SERPL-CCNC: 72 U/L (ref 40–150)
ALT SERPL W P-5'-P-CCNC: 22 U/L (ref 0–70)
ANION GAP SERPL CALCULATED.3IONS-SCNC: 3 MMOL/L (ref 3–14)
APPEARANCE UR: CLEAR
AST SERPL W P-5'-P-CCNC: 12 U/L (ref 0–45)
BASOPHILS # BLD AUTO: 0 10E9/L (ref 0–0.2)
BASOPHILS NFR BLD AUTO: 0.4 %
BILIRUB SERPL-MCNC: 0.3 MG/DL (ref 0.2–1.3)
BILIRUB UR QL STRIP: NEGATIVE
BUN SERPL-MCNC: 15 MG/DL (ref 7–30)
CALCIUM SERPL-MCNC: 8.5 MG/DL (ref 8.5–10.1)
CHLORIDE SERPL-SCNC: 109 MMOL/L (ref 94–109)
CO2 SERPL-SCNC: 27 MMOL/L (ref 20–32)
COLOR UR AUTO: YELLOW
CREAT SERPL-MCNC: 1.03 MG/DL (ref 0.66–1.25)
DIFFERENTIAL METHOD BLD: ABNORMAL
EOSINOPHIL # BLD AUTO: 0 10E9/L (ref 0–0.7)
EOSINOPHIL NFR BLD AUTO: 0.8 %
ERYTHROCYTE [DISTWIDTH] IN BLOOD BY AUTOMATED COUNT: 22.7 % (ref 10–15)
GFR SERPL CREATININE-BSD FRML MDRD: >90 ML/MIN/{1.73_M2}
GLUCOSE SERPL-MCNC: 145 MG/DL (ref 70–99)
GLUCOSE UR STRIP-MCNC: NEGATIVE MG/DL
HCT VFR BLD AUTO: 44.3 % (ref 40–53)
HGB BLD-MCNC: 13.7 G/DL (ref 13.3–17.7)
HGB UR QL STRIP: NEGATIVE
IMM GRANULOCYTES # BLD: 0 10E9/L (ref 0–0.4)
IMM GRANULOCYTES NFR BLD: 0.4 %
KETONES UR STRIP-MCNC: 10 MG/DL
LABORATORY COMMENT REPORT: NORMAL
LEUKOCYTE ESTERASE UR QL STRIP: NEGATIVE
LIPASE SERPL-CCNC: 185 U/L (ref 73–393)
LYMPHOCYTES # BLD AUTO: 0.7 10E9/L (ref 0.8–5.3)
LYMPHOCYTES NFR BLD AUTO: 13.9 %
MCH RBC QN AUTO: 25.8 PG (ref 26.5–33)
MCHC RBC AUTO-ENTMCNC: 30.9 G/DL (ref 31.5–36.5)
MCV RBC AUTO: 83 FL (ref 78–100)
MONOCYTES # BLD AUTO: 0.3 10E9/L (ref 0–1.3)
MONOCYTES NFR BLD AUTO: 5.1 %
MUCOUS THREADS #/AREA URNS LPF: PRESENT /LPF
NEUTROPHILS # BLD AUTO: 4.2 10E9/L (ref 1.6–8.3)
NEUTROPHILS NFR BLD AUTO: 79.4 %
NITRATE UR QL: NEGATIVE
NRBC # BLD AUTO: 0 10*3/UL
NRBC BLD AUTO-RTO: 0 /100
PH UR STRIP: 5.5 PH (ref 5–7)
PLATELET # BLD AUTO: 280 10E9/L (ref 150–450)
POTASSIUM SERPL-SCNC: 3.4 MMOL/L (ref 3.4–5.3)
PROT SERPL-MCNC: 6.9 G/DL (ref 6.8–8.8)
RBC # BLD AUTO: 5.31 10E12/L (ref 4.4–5.9)
RBC #/AREA URNS AUTO: 2 /HPF (ref 0–2)
SARS-COV-2 RNA RESP QL NAA+PROBE: NEGATIVE
SODIUM SERPL-SCNC: 139 MMOL/L (ref 133–144)
SOURCE: ABNORMAL
SP GR UR STRIP: 1.03 (ref 1–1.03)
SPECIMEN SOURCE: NORMAL
UROBILINOGEN UR STRIP-MCNC: NORMAL MG/DL (ref 0–2)
WBC # BLD AUTO: 5.3 10E9/L (ref 4–11)
WBC #/AREA URNS AUTO: 1 /HPF (ref 0–5)

## 2021-03-05 PROCEDURE — 96375 TX/PRO/DX INJ NEW DRUG ADDON: CPT

## 2021-03-05 PROCEDURE — 250N000009 HC RX 250: Performed by: INTERNAL MEDICINE

## 2021-03-05 PROCEDURE — 250N000011 HC RX IP 250 OP 636: Performed by: INTERNAL MEDICINE

## 2021-03-05 PROCEDURE — 250N000013 HC RX MED GY IP 250 OP 250 PS 637: Performed by: INTERNAL MEDICINE

## 2021-03-05 PROCEDURE — 83690 ASSAY OF LIPASE: CPT | Performed by: EMERGENCY MEDICINE

## 2021-03-05 PROCEDURE — 99285 EMERGENCY DEPT VISIT HI MDM: CPT | Mod: 25

## 2021-03-05 PROCEDURE — 250N000011 HC RX IP 250 OP 636: Performed by: EMERGENCY MEDICINE

## 2021-03-05 PROCEDURE — 80053 COMPREHEN METABOLIC PANEL: CPT | Performed by: EMERGENCY MEDICINE

## 2021-03-05 PROCEDURE — 258N000003 HC RX IP 258 OP 636: Performed by: EMERGENCY MEDICINE

## 2021-03-05 PROCEDURE — 96365 THER/PROPH/DIAG IV INF INIT: CPT

## 2021-03-05 PROCEDURE — 87635 SARS-COV-2 COVID-19 AMP PRB: CPT | Performed by: EMERGENCY MEDICINE

## 2021-03-05 PROCEDURE — 74177 CT ABD & PELVIS W/CONTRAST: CPT

## 2021-03-05 PROCEDURE — 74019 RADEX ABDOMEN 2 VIEWS: CPT

## 2021-03-05 PROCEDURE — 85025 COMPLETE CBC W/AUTO DIFF WBC: CPT | Performed by: EMERGENCY MEDICINE

## 2021-03-05 PROCEDURE — 258N000003 HC RX IP 258 OP 636: Performed by: INTERNAL MEDICINE

## 2021-03-05 PROCEDURE — 96361 HYDRATE IV INFUSION ADD-ON: CPT

## 2021-03-05 PROCEDURE — 120N000004 HC R&B MS OVERFLOW

## 2021-03-05 PROCEDURE — 81001 URINALYSIS AUTO W/SCOPE: CPT | Performed by: EMERGENCY MEDICINE

## 2021-03-05 PROCEDURE — C9803 HOPD COVID-19 SPEC COLLECT: HCPCS

## 2021-03-05 PROCEDURE — 99207 PR CDG-HISTORY COMP: MEETS EXP. PROB FOCUSED- DOWN CODED LACK OF PFSH: CPT | Performed by: INTERNAL MEDICINE

## 2021-03-05 PROCEDURE — 99221 1ST HOSP IP/OBS SF/LOW 40: CPT | Mod: AI | Performed by: INTERNAL MEDICINE

## 2021-03-05 PROCEDURE — 87086 URINE CULTURE/COLONY COUNT: CPT | Performed by: EMERGENCY MEDICINE

## 2021-03-05 RX ORDER — KETOROLAC TROMETHAMINE 15 MG/ML
10 INJECTION, SOLUTION INTRAMUSCULAR; INTRAVENOUS ONCE
Status: COMPLETED | OUTPATIENT
Start: 2021-03-05 | End: 2021-03-05

## 2021-03-05 RX ORDER — HYDROMORPHONE HCL IN WATER/PF 6 MG/30 ML
0.2 PATIENT CONTROLLED ANALGESIA SYRINGE INTRAVENOUS
Status: DISCONTINUED | OUTPATIENT
Start: 2021-03-05 | End: 2021-03-07 | Stop reason: HOSPADM

## 2021-03-05 RX ORDER — ACETAMINOPHEN 325 MG/1
650 TABLET ORAL EVERY 4 HOURS PRN
Status: DISCONTINUED | OUTPATIENT
Start: 2021-03-05 | End: 2021-03-07 | Stop reason: HOSPADM

## 2021-03-05 RX ORDER — OXYCODONE HYDROCHLORIDE 5 MG/1
5-10 TABLET ORAL
Status: DISCONTINUED | OUTPATIENT
Start: 2021-03-05 | End: 2021-03-07 | Stop reason: HOSPADM

## 2021-03-05 RX ORDER — NALOXONE HYDROCHLORIDE 0.4 MG/ML
0.4 INJECTION, SOLUTION INTRAMUSCULAR; INTRAVENOUS; SUBCUTANEOUS
Status: DISCONTINUED | OUTPATIENT
Start: 2021-03-05 | End: 2021-03-07 | Stop reason: HOSPADM

## 2021-03-05 RX ORDER — IOPAMIDOL 755 MG/ML
500 INJECTION, SOLUTION INTRAVASCULAR ONCE
Status: COMPLETED | OUTPATIENT
Start: 2021-03-05 | End: 2021-03-05

## 2021-03-05 RX ORDER — METHYLPREDNISOLONE SODIUM SUCCINATE 40 MG/ML
20 INJECTION, POWDER, LYOPHILIZED, FOR SOLUTION INTRAMUSCULAR; INTRAVENOUS EVERY 12 HOURS
Status: DISCONTINUED | OUTPATIENT
Start: 2021-03-06 | End: 2021-03-07 | Stop reason: HOSPADM

## 2021-03-05 RX ORDER — PROCHLORPERAZINE MALEATE 10 MG
10 TABLET ORAL EVERY 6 HOURS PRN
Status: DISCONTINUED | OUTPATIENT
Start: 2021-03-05 | End: 2021-03-07 | Stop reason: HOSPADM

## 2021-03-05 RX ORDER — AMPICILLIN AND SULBACTAM 2; 1 G/1; G/1
3 INJECTION, POWDER, FOR SOLUTION INTRAMUSCULAR; INTRAVENOUS ONCE
Status: COMPLETED | OUTPATIENT
Start: 2021-03-05 | End: 2021-03-05

## 2021-03-05 RX ORDER — LIDOCAINE 40 MG/G
CREAM TOPICAL
Status: DISCONTINUED | OUTPATIENT
Start: 2021-03-05 | End: 2021-03-07 | Stop reason: HOSPADM

## 2021-03-05 RX ORDER — ONDANSETRON 4 MG/1
4 TABLET, ORALLY DISINTEGRATING ORAL EVERY 6 HOURS PRN
Status: DISCONTINUED | OUTPATIENT
Start: 2021-03-05 | End: 2021-03-07 | Stop reason: HOSPADM

## 2021-03-05 RX ORDER — NALOXONE HYDROCHLORIDE 0.4 MG/ML
0.2 INJECTION, SOLUTION INTRAMUSCULAR; INTRAVENOUS; SUBCUTANEOUS
Status: DISCONTINUED | OUTPATIENT
Start: 2021-03-05 | End: 2021-03-07 | Stop reason: HOSPADM

## 2021-03-05 RX ORDER — ONDANSETRON 2 MG/ML
4 INJECTION INTRAMUSCULAR; INTRAVENOUS EVERY 6 HOURS PRN
Status: DISCONTINUED | OUTPATIENT
Start: 2021-03-05 | End: 2021-03-07 | Stop reason: HOSPADM

## 2021-03-05 RX ORDER — ACETAMINOPHEN 500 MG
500-1000 TABLET ORAL EVERY 6 HOURS PRN
COMMUNITY

## 2021-03-05 RX ORDER — METHYLPREDNISOLONE SODIUM SUCCINATE 40 MG/ML
40 INJECTION, POWDER, LYOPHILIZED, FOR SOLUTION INTRAMUSCULAR; INTRAVENOUS ONCE
Status: COMPLETED | OUTPATIENT
Start: 2021-03-05 | End: 2021-03-05

## 2021-03-05 RX ORDER — POTASSIUM CHLORIDE 1500 MG/1
20 TABLET, EXTENDED RELEASE ORAL ONCE
Status: COMPLETED | OUTPATIENT
Start: 2021-03-05 | End: 2021-03-05

## 2021-03-05 RX ORDER — SODIUM CHLORIDE 9 MG/ML
INJECTION, SOLUTION INTRAVENOUS CONTINUOUS
Status: DISCONTINUED | OUTPATIENT
Start: 2021-03-05 | End: 2021-03-06

## 2021-03-05 RX ORDER — PROCHLORPERAZINE 25 MG
25 SUPPOSITORY, RECTAL RECTAL EVERY 12 HOURS PRN
Status: DISCONTINUED | OUTPATIENT
Start: 2021-03-05 | End: 2021-03-07 | Stop reason: HOSPADM

## 2021-03-05 RX ORDER — PREDNISONE 10 MG/1
TABLET ORAL DAILY
COMMUNITY
End: 2022-08-23

## 2021-03-05 RX ADMIN — AMPICILLIN SODIUM AND SULBACTAM SODIUM 3 G: 2; 1 INJECTION, POWDER, FOR SOLUTION INTRAMUSCULAR; INTRAVENOUS at 18:34

## 2021-03-05 RX ADMIN — FAMOTIDINE 20 MG: 10 INJECTION, SOLUTION INTRAVENOUS at 21:18

## 2021-03-05 RX ADMIN — SODIUM CHLORIDE 57 ML: 9 INJECTION, SOLUTION INTRAVENOUS at 16:13

## 2021-03-05 RX ADMIN — KETOROLAC TROMETHAMINE 10 MG: 15 INJECTION, SOLUTION INTRAMUSCULAR; INTRAVENOUS at 18:33

## 2021-03-05 RX ADMIN — SODIUM CHLORIDE, POTASSIUM CHLORIDE, SODIUM LACTATE AND CALCIUM CHLORIDE 1000 ML: 600; 310; 30; 20 INJECTION, SOLUTION INTRAVENOUS at 15:38

## 2021-03-05 RX ADMIN — IOPAMIDOL 66 ML: 755 INJECTION, SOLUTION INTRAVENOUS at 16:13

## 2021-03-05 RX ADMIN — SODIUM CHLORIDE: 9 INJECTION, SOLUTION INTRAVENOUS at 20:28

## 2021-03-05 RX ADMIN — METHYLPREDNISOLONE SODIUM SUCCINATE 40 MG: 40 INJECTION, POWDER, FOR SOLUTION INTRAMUSCULAR; INTRAVENOUS at 18:33

## 2021-03-05 RX ADMIN — TAZOBACTAM SODIUM AND PIPERACILLIN SODIUM 3.38 G: 375; 3 INJECTION, SOLUTION INTRAVENOUS at 23:34

## 2021-03-05 RX ADMIN — POTASSIUM CHLORIDE 20 MEQ: 1500 TABLET, EXTENDED RELEASE ORAL at 21:18

## 2021-03-05 ASSESSMENT — ENCOUNTER SYMPTOMS
FEVER: 1
DYSURIA: 1
ABDOMINAL PAIN: 1
FREQUENCY: 1
HEMATURIA: 0

## 2021-03-05 ASSESSMENT — MIFFLIN-ST. JEOR: SCORE: 1607.72

## 2021-03-05 NOTE — ED TRIAGE NOTES
Pt dx with chron's about 2 months ago, the last few days pt is c/o low ab pain with straining to use the bathroom and dysuria.

## 2021-03-05 NOTE — LETTER
[facility]  [address]  [facility] [address]  [phone]    Re: Romina Dave  [address] [place] [id number]  [phone] (home)     : 2000      To Whom It May Concern:      Romina Dave was hospitalized from 3/5/2021 until 3/7/2021 due to medical illness.  He may return to work on 3/8/21 with full duty.        Sincerely,  Marcos Bañuelos MD

## 2021-03-05 NOTE — ED PROVIDER NOTES
History   Chief Complaint:  Abdominal Pain     HPI   Romina Dave is a 20 year old male with history of Crohn's disease and small bowel obstruction who presents with abdominal pain. The patient states that he has had some lower abdominal pain for the last few days. The patient states that this pain is different from his prior obstructions or crohn's flares. He states that he is also having some urinary symptoms including, dysuria, penile pain, frequency and urgency.  He states that he is not sure if his bladder is draining fully. He denies any hematuria, penile discharge, or scrotal swelling. He reports a fever 2 nights ago and has been taking Tylenol for pain. He states that his GI care team has advised against Advil usage.     Review of Systems   Constitutional: Positive for fever.   Gastrointestinal: Positive for abdominal pain.   Genitourinary: Positive for dysuria, frequency, penile pain and urgency. Negative for discharge, hematuria and scrotal swelling.   All other systems reviewed and are negative.        Allergies:  The patient has no known allergies.     Medications:  Remicade     Past Medical History:    Crohn's disease   SBO     Past Surgical History:    The patient denies past surgical history.      Family History:    The patient denies past family history.     Social History:  Never smoker     Physical Exam     Patient Vitals for the past 24 hrs:   BP Temp Pulse Resp SpO2   03/05/21 1745 110/71 -- -- -- --   03/05/21 1740 110/71 -- -- -- --   03/05/21 1540 113/72 -- 84 -- --   03/05/21 1441 (!) 123/91 98.6  F (37  C) 96 16 98 %       Physical Exam      Eyes: periorbital tissue and sclera normal  Neck: supple  CV: ppi, regular   Resp: speaking in full sentences without any resp distress  Abd: Tenderness palpation across the lower abdomen, mild, no significant distention, no peritonitis  Ext: peripheral edema present:  No  Skin: warm dry well perfused  Neuro: Alert, no gross motor or sensory  []Manual[]Template  []Copied   SIGIFREDO Santa          Chief Complaint   Patient presents with   • tonsils         HPI  Kev Gonzalez is a  15 y.o.  female who complains of frequent tonsillitis, large tonsils and tonsillar globus. The symptoms are localized to the throat. The patient has had moderate to severe symptoms. The symptoms have been chronically occuring with acute flair ups occurring 3-4 times a year for the last several years The symptoms are aggravated by  no identifiable factors. The symptoms are improved by no identifiable factors.     Review of Systems   Constitutional: Negative for activity change, appetite change, chills, diaphoresis, fatigue, fever and unexpected weight change.   HENT: Positive for sore throat. Negative for congestion, dental problem, drooling, ear discharge, ear pain, facial swelling, hearing loss, mouth sores, nosebleeds, postnasal drip, rhinorrhea, sinus pressure, sneezing, tinnitus, trouble swallowing and voice change.    Eyes: Negative.    Respiratory: Negative.    Cardiovascular: Negative.    Gastrointestinal: Negative.    Endocrine: Negative.    Skin: Negative.    Allergic/Immunologic: Negative for environmental allergies, food allergies and immunocompromised state.   Neurological: Negative.    Hematological: Negative.    Psychiatric/Behavioral: Negative.             Past History:  Medical History        Past Medical History:   Diagnosis Date   • Strep throat     • Tonsillitis           Surgical History         Past Surgical History:   Procedure Laterality Date   • NO PAST SURGERIES                   Family History   Problem Relation Age of Onset   • No Known Problems Mother     • No Known Problems Father               Social History   Substance Use Topics   • Smoking status: Never Smoker   • Smokeless tobacco: Never Used         Comment: not exposed   • Alcohol use No      Active Medications          Outpatient Prescriptions Marked as Taking for the 8/31/18  deficits,  gait stable        Emergency Department Course     Imaging:    Abdomen XR 2 views, flat and upright:   Nonobstructed bowel gas pattern.   Reading per radiology    Abdomen Pelvis CT IV contrast:  1.  Prominent diffuse thickening and mucosal hyperenhancement   involving approximately 20 cm terminal ileum. Findings are consistent   with active Crohn's inflammation.   2.  Mild bowel wall thickening involving the sigmoid colon could be   related to lack of distention, although a region of Crohn's colitis   could also have this appearance.   3.  1.8 x 1.4 cm rim-enhancing area of fluid within the pelvis   posteriorly on the left could possibly represent a small abscess.   Clinical correlation and follow-up is recommended.  Reading per radiology    Laboratory:     CBC: WBC 5.3, HGB 13.7,      UA with microscopic: urineketon 10 (A) protein albumin 20 (A) mucus present (A) o/w WNL     Urine culture: pending    CMP: glucose 145 (H) albumin 2.9 (L)  o/w WNL (Creatinine 1.03)     Lipase: 185     Asymptomatic COVID19 Virus PCR by nasopharyngeal swab pending    Procedures    Emergency Department Course:    Reviewed:  I reviewed nursing notes, vitals, past medical history and care everywhere    Assessments:  1455 I obtained history and examined the patient as noted above.     1650 I rechecked the patient and explained findings.     1720 I returned to check on patient.      1755 I returned to check on patient and explained findings prior to admit.     Consults:   1735 I spoke with Dr. Disla of [facility] regarding patient's presentation, findings, and plan of care.     1820 I spoke with Dr. Bañuelos of the Hospitalist service from [facility] regarding patient's presentation, findings, and plan of care.    Interventions:  1538 LR 1 L IV    Toradol 10 mg IV   Unasyn 3 g    solu-medrol 40 mg IV     Disposition:  The patient was admitted to the hospital under the care of Dr. Bañuelos.     Impression & Plan     CMS  encounter (Office Visit) with Willem Hernandez PA   Medication Sig Dispense Refill   • norethindrone-ethinyl estradiol (JUNEL FE 1/20) 1-20 MG-MCG per tablet Take 1 tablet by mouth Daily.             Allergies:  Amoxicillin     Vital Signs:  Temp:  [97.8 °F (36.6 °C)] 97.8 °F (36.6 °C)     Physical Exam  CONSTITUTIONAL: well nourished, well-developed, alert, oriented, in no acute distress   COMMUNICATION AND VOICE: able to communicate normally for age, normal voice/cry quality  HEAD: normocephalic, no lesions, atraumatic, no tenderness, no masses   FACE: appearance normal, no lesions, no tenderness, no deformities, facial motion symmetric  SALIVARY GLANDS: parotid glands with no tenderness, no swelling, no masses, submandibular glands with normal size, nontender  EYES: ocular motility normal, eyelids normal, orbits normal, no proptosis, conjunctiva normal , pupils equal, round   EARS:  Hearing: response to conversational voice normal bilaterally   External Ears: auricles without lesions  EXTERNAL EAR CANALS: normal ear canals without stenosis or significant cerumen  TYMPANIC MEMBRANES: tympanic membrane appearance normal, no lesions, no perforation, normal mobility, no fluid  NOSE:  External Nose: structure normal, no tenderness on palpation, no nasal discharge, no lesions, no evidence of trauma, nostrils patent   Intranasal Exam: nasal mucosa normal, vestibule within normal limits, inferior turbinate normal, nasal septum midline   Nasopharynx: mirror exam deferred  ORAL:  Lips: upper and lower lips without lesion   Teeth: dentition within normal limits for age   Gums: gingivae healthy   Oral Mucosa: oral mucosa normal, no mucosal lesions   Floor of Mouth: Warthin’s duct patent, mucosa normal  Tongue: lingual mucosa normal without lesions, normal tongue mobility   Palate: soft and hard palates with normal mucosa and structure  Oropharynx: oropharynx mild erythema present, tonsils 4+ size, cryptic bilaterally  Diagnoses: None    Medical Decision Makin y male history of Crohn's disease here with lower abdominal pain as well as urinary frequency and dysuria.    Urine shows no evidence of pyuria.  Basic blood test unremarkable and x-ray of the abdomen did not show obstruction so given lack pathology found with initial work-up when had with a CT scan given history of underlying Crohn's disease.  CT showing the above findings including active Crohn's and possible small abscess.  Discussed with Dr. Yeboah of [place] [facility] recommended initiating steroids as well as covering with antibiotics for the time being.  Patient updated on findings was given steroids here in the ED as well as a dose of Unasyn.  Will admit for further evaluation and monitoring ensuring that he has response to treatment.  If had escalating pain new fever started appear more ill may need repeat CT scan if abscess growing IR drainage.    Covid-19  Romina Dave was evaluated during a global COVID-19 pandemic, which necessitated consideration that the patient might be at risk for infection with the SARS-CoV-2 virus that causes COVID-19.   Applicable protocols for evaluation were followed during the patient's care.   COVID-19 was considered as part of the patient's evaluation. A test was completed today.     Diagnosis:    ICD-10-CM    1. Crohn's disease of both small and large intestine with abscess (H)  K50.814        Scribe Disclosure:  Alejandra ALBERT, am serving as a scribe at 2:54 PM on 3/5/2021 to document services personally performed by Benjy Nieves MD based on my observations and the provider's statements to me.            Benjy Nieves MD  21 1445     appearance, erythematous bilaterally  HYPOPHARYNX: mirror exam deferred  LARYNX: mirror exam deferred   NECK: neck appearance normal, no masses or tenderness  THYROID: no overt thyromegaly, no tenderness, nodules or mass present on palpation, position midline   LYMPHATIC: no lymphadenopathy  CHEST/RESPIRATORY: respiratory effort normal, normal chest excursion   CARDIOVASCULAR: extremities without cyanosis or edema   NEUROLOGIC/PSYCHIATRIC: oriented appropriately, mood normal, affect appropriate for age, CN II-XII intact grossly        Assessment      1. Chronic tonsillitis and adenoiditis    2. Recurrent streptococcal tonsillitis    3. Hypertrophy of tonsils and adenoids    4. Cryptic tonsil             Plan      Medical and surgical options were discussed including observation versus surgical management. Risks, benefits and alternatives were discussed and questions were answered.  After considering the options, it was decided that tonsillectomy and adenoidectomy was the best option.     INFORMED CONSENT DISCUSSION:  TONSILLECTOMY AND ADENOIDECTOMY: A tonsillectomy and adenoidectomy were recommended. The risks and benefits were explained including but not limited to early and late bleeding, infection, risks of the general anesthesia, dysphagia and poor PO intake, and voice change/VPI.  Alternatives were discussed. Understanding of the risks was demonstrated. Questions were asked appropriately answered.      PREOPERATIVE WORKUP:   As directed     FOLLOW UP:  follow up postoperatively

## 2021-03-06 LAB
ANION GAP SERPL CALCULATED.3IONS-SCNC: 3 MMOL/L (ref 3–14)
BACTERIA SPEC CULT: NORMAL
BASOPHILS # BLD AUTO: 0 10E9/L (ref 0–0.2)
BASOPHILS NFR BLD AUTO: 0 %
BUN SERPL-MCNC: 9 MG/DL (ref 7–30)
CALCIUM SERPL-MCNC: 8.5 MG/DL (ref 8.5–10.1)
CHLORIDE SERPL-SCNC: 110 MMOL/L (ref 94–109)
CO2 SERPL-SCNC: 26 MMOL/L (ref 20–32)
CREAT SERPL-MCNC: 1.02 MG/DL (ref 0.66–1.25)
CRP SERPL-MCNC: 76.9 MG/L (ref 0–8)
DIFFERENTIAL METHOD BLD: ABNORMAL
EOSINOPHIL # BLD AUTO: 0 10E9/L (ref 0–0.7)
EOSINOPHIL NFR BLD AUTO: 0 %
ERYTHROCYTE [DISTWIDTH] IN BLOOD BY AUTOMATED COUNT: 22.7 % (ref 10–15)
ERYTHROCYTE [SEDIMENTATION RATE] IN BLOOD BY WESTERGREN METHOD: 23 MM/H (ref 0–15)
GFR SERPL CREATININE-BSD FRML MDRD: >90 ML/MIN/{1.73_M2}
GLUCOSE SERPL-MCNC: 122 MG/DL (ref 70–99)
HCT VFR BLD AUTO: 44.5 % (ref 40–53)
HGB BLD-MCNC: 14 G/DL (ref 13.3–17.7)
IMM GRANULOCYTES # BLD: 0 10E9/L (ref 0–0.4)
IMM GRANULOCYTES NFR BLD: 0.4 %
LYMPHOCYTES # BLD AUTO: 0.4 10E9/L (ref 0.8–5.3)
LYMPHOCYTES NFR BLD AUTO: 13.7 %
Lab: NORMAL
MCH RBC QN AUTO: 26.8 PG (ref 26.5–33)
MCHC RBC AUTO-ENTMCNC: 31.5 G/DL (ref 31.5–36.5)
MCV RBC AUTO: 85 FL (ref 78–100)
MONOCYTES # BLD AUTO: 0.1 10E9/L (ref 0–1.3)
MONOCYTES NFR BLD AUTO: 3.5 %
NEUTROPHILS # BLD AUTO: 2.3 10E9/L (ref 1.6–8.3)
NEUTROPHILS NFR BLD AUTO: 82.4 %
NRBC # BLD AUTO: 0 10*3/UL
NRBC BLD AUTO-RTO: 0 /100
PLATELET # BLD AUTO: 280 10E9/L (ref 150–450)
POTASSIUM SERPL-SCNC: 4.5 MMOL/L (ref 3.4–5.3)
RBC # BLD AUTO: 5.23 10E12/L (ref 4.4–5.9)
SODIUM SERPL-SCNC: 139 MMOL/L (ref 133–144)
SPECIMEN SOURCE: NORMAL
WBC # BLD AUTO: 2.8 10E9/L (ref 4–11)

## 2021-03-06 PROCEDURE — 80048 BASIC METABOLIC PNL TOTAL CA: CPT | Performed by: INTERNAL MEDICINE

## 2021-03-06 PROCEDURE — 99231 SBSQ HOSP IP/OBS SF/LOW 25: CPT | Performed by: INTERNAL MEDICINE

## 2021-03-06 PROCEDURE — 85025 COMPLETE CBC W/AUTO DIFF WBC: CPT | Performed by: INTERNAL MEDICINE

## 2021-03-06 PROCEDURE — 99207 PR CDG-MDM COMPONENT: MEETS LOW - DOWN CODED: CPT | Performed by: INTERNAL MEDICINE

## 2021-03-06 PROCEDURE — 250N000009 HC RX 250: Performed by: INTERNAL MEDICINE

## 2021-03-06 PROCEDURE — 36415 COLL VENOUS BLD VENIPUNCTURE: CPT | Performed by: INTERNAL MEDICINE

## 2021-03-06 PROCEDURE — 86140 C-REACTIVE PROTEIN: CPT | Performed by: INTERNAL MEDICINE

## 2021-03-06 PROCEDURE — 250N000011 HC RX IP 250 OP 636: Performed by: INTERNAL MEDICINE

## 2021-03-06 PROCEDURE — 258N000003 HC RX IP 258 OP 636: Performed by: INTERNAL MEDICINE

## 2021-03-06 PROCEDURE — 85652 RBC SED RATE AUTOMATED: CPT | Performed by: INTERNAL MEDICINE

## 2021-03-06 PROCEDURE — 120N000004 HC R&B MS OVERFLOW

## 2021-03-06 RX ADMIN — METHYLPREDNISOLONE SODIUM SUCCINATE 20 MG: 40 INJECTION, POWDER, FOR SOLUTION INTRAMUSCULAR; INTRAVENOUS at 07:47

## 2021-03-06 RX ADMIN — FAMOTIDINE 20 MG: 10 INJECTION, SOLUTION INTRAVENOUS at 09:11

## 2021-03-06 RX ADMIN — TAZOBACTAM SODIUM AND PIPERACILLIN SODIUM 3.38 G: 375; 3 INJECTION, SOLUTION INTRAVENOUS at 05:51

## 2021-03-06 RX ADMIN — HYDROMORPHONE HYDROCHLORIDE 0.2 MG: 0.2 INJECTION, SOLUTION INTRAMUSCULAR; INTRAVENOUS; SUBCUTANEOUS at 05:49

## 2021-03-06 RX ADMIN — SODIUM CHLORIDE: 9 INJECTION, SOLUTION INTRAVENOUS at 05:53

## 2021-03-06 RX ADMIN — FAMOTIDINE 20 MG: 10 INJECTION, SOLUTION INTRAVENOUS at 20:14

## 2021-03-06 RX ADMIN — TAZOBACTAM SODIUM AND PIPERACILLIN SODIUM 3.38 G: 375; 3 INJECTION, SOLUTION INTRAVENOUS at 17:49

## 2021-03-06 RX ADMIN — TAZOBACTAM SODIUM AND PIPERACILLIN SODIUM 3.38 G: 375; 3 INJECTION, SOLUTION INTRAVENOUS at 12:12

## 2021-03-06 RX ADMIN — TAZOBACTAM SODIUM AND PIPERACILLIN SODIUM 3.38 G: 375; 3 INJECTION, SOLUTION INTRAVENOUS at 23:56

## 2021-03-06 RX ADMIN — METHYLPREDNISOLONE SODIUM SUCCINATE 20 MG: 40 INJECTION, POWDER, FOR SOLUTION INTRAMUSCULAR; INTRAVENOUS at 19:00

## 2021-03-06 NOTE — ED NOTES
[facility]  ED Nurse Handoff Report    Romina Dave is a 20 year old male   ED Chief complaint: Abdominal Pain  . ED Diagnosis:   Final diagnoses:   Crohn's disease of both small and large intestine with abscess (H)     Allergies: No Known Allergies    Code Status: Prior  Activity level - Baseline/Home:  Independent. Activity Level - Current:   Stand by Assist. Lift room needed: No. Bariatric: No   Needed: No   Isolation: No. Infection: Not Applicable.     Vital Signs:   Vitals:    03/05/21 1441 03/05/21 1540 03/05/21 1740 03/05/21 1745   BP: (!) 123/91 113/72 110/71 110/71   Pulse: 96 84     Resp: 16      Temp: 98.6  F (37  C)      SpO2: 98%          Cardiac Rhythm:  ,      Pain level:    Patient confused: No. Patient Falls Risk: Yes.   Elimination Status: Has voided   Patient Report - Initial Complaint: Pt dx with chron's about 2 months ago, the last few days pt is c/o low ab pain with straining to use the bathroom and dysuria.. Focused Assessment:   Gastrointestinal - Gastrointestinal WDL: GI symptoms  GI Signs/Symptoms: abdominal discomfort  Gastrointestinal Comment:  (was prescribed Prednisone - has not taken any yet) PANIAGUA JADE RAO MS       Mar 5 15:00 15:00:00 Genitourinary Genitourinary - Genitourinary WDL: voiding ability/characteristics  Voiding Characteristics: strains to void; frequency         Tests Performed: Labs, CT, Xray . Abnormal Results:   Labs Ordered and Resulted from Time of ED Arrival Up to the Time of Departure from the ED   ROUTINE UA WITH MICROSCOPIC - Abnormal; Notable for the following components:       Result Value    Ketones Urine 10 (*)     Protein Albumin Urine 20 (*)     Mucous Urine Present (*)     All other components within normal limits   CBC WITH PLATELETS DIFFERENTIAL - Abnormal; Notable for the following components:    MCH 25.8 (*)     MCHC 30.9 (*)     RDW 22.7 (*)     Absolute Lymphocytes 0.7 (*)     All other components within normal limits    COMPREHENSIVE METABOLIC PANEL - Abnormal; Notable for the following components:    Glucose 145 (*)     Albumin 2.9 (*)     All other components within normal limits   LIPASE   SARS-COV-2 (COVID-19) VIRUS RT-PCR   URINE CULTURE AEROBIC BACTERIAL     Abd/pelvis CT,  IV  contrast only TRAUMA / AAA   Final Result   IMPRESSION:    1.  There is prominent diffuse bowel wall thickening and mucosal   hyperenhancement involving approximately 20 cm of terminal ileum.   Findings are consistent with active Crohn's inflammation.   2.  Mild bowel wall thickening involving the sigmoid colon could be   related to lack of distention, although a region of Crohn's colitis   could also have this appearance.   3.  1.8 x 1.4 cm rim-enhancing area of fluid within the pelvis   posteriorly on the left could possibly represent a small abscess.   Clinical correlation and follow-up is recommended.      JHOANA CLEMONS MD      Abdomen XR, 2 vw, flat and upright   Final Result   IMPRESSION: Nonobstructed bowel gas pattern.      LITTLE TOVAR MD        .   Treatments provided: Fluids, Pain medications, Methylprednisone, Antibiotics  Family Comments: NA  OBS brochure/video discussed/provided to patient:  Yes  ED Medications:   Medications   ampicillin-sulbactam (UNASYN) 3 g vial to attach to  mL bag (3 g Intravenous New Bag 3/5/21 1834)   lactated ringers BOLUS 1,000 mL (0 mLs Intravenous Stopped 3/5/21 1832)   0.9% sodium chloride BOLUS (57 mLs Intravenous New Bag 3/5/21 1613)   iopamidol (ISOVUE-370) solution 500 mL (66 mLs Intravenous Given 3/5/21 1613)   ketorolac (TORADOL) injection 10 mg (10 mg Intravenous Given 3/5/21 1833)   methylPREDNISolone sodium succinate (solu-MEDROL) injection 40 mg (40 mg Intravenous Given 3/5/21 1833)     Drips infusing:  Yes  For the majority of the shift, the patient's behavior Green. Interventions performed were NA.    Sepsis treatment initiated: No     Patient tested for COVID 19 prior to admission:  YES    ED Nurse Name/Phone Number: Christiana Alarcon RN,   6:52 PM    RECEIVING UNIT ED HANDOFF REVIEW    Above ED Nurse Handoff Report was reviewed: Yes  Reviewed by: ORLANDO HERRERA RN on March 5, 2021 at 7:09 PM

## 2021-03-06 NOTE — PHARMACY-ADMISSION MEDICATION HISTORY
Admission medication history interview status for this patient is complete. See [facility] admission navigator for allergy information, prior to admission medications and immunization status.     Medication history interview done, indicate source(s): Patient  Medication history resources (including written lists, pill bottles, clinic record): SureScripts and Care Everywhere  Pharmacy: [facility] for discharge    Changes made to PTA medication list:  Added: APAP and different prednisone taper  Deleted: old prednisone taper (from 11/2020), ondansetron, iron infusion (no longer receiving)  Changed: none    Actions taken by pharmacist (provider contacted, etc): Spoke with patient to verify home med list     Additional medication history information: Last Remicade infusion was about 2 weeks ago and next is scheduled sometime in April, per patient.    Medication reconciliation/reorder completed by provider prior to medication history?  Y   (Y/N)     Prior to Admission medications    Medication Sig Last Dose Taking? Auth Provider   acetaminophen (TYLENOL) 500 MG tablet Take 500-1,000 mg by mouth every 6 hours as needed for mild pain 3/5/2021 at am Yes Unknown, Entered By History   inFLIXimab (REMICADE IV) Every 8 weeks 2/18/2021 Yes Unknown, Entered By History   Multiple Vitamins-Minerals (MULTIVITAMIN GUMMIES ADULT PO) Take 1 chew tab by mouth daily Past Week at Unknown time Yes Unknown, Entered By History   predniSONE (DELTASONE) 10 MG tablet daily 40mg daily x7 days, 30mg daily x7 days, 20mg daily x7 days, 5mg daily x7 days.   Unknown, Entered By History

## 2021-03-06 NOTE — CONSULTS
"CLINICAL NUTRITION SERVICES  -  ASSESSMENT NOTE      MALNUTRITION:  % Weight Loss:  None noted  % Intake:  <75% for >/= 3 months (non-severe malnutrition)  Subcutaneous Fat Loss:  None observed   Muscle Loss:  Temporal region mild depletion, Clavicle bone region mild to moderate depletion and Acromion bone region mild to moderate depletion  Fluid Retention: None documented    Malnutrition Diagnosis: Non-Severe malnutrition  In Context of:  Acute illness or injury with underlying chronic illness or disease        REASON FOR ASSESSMENT  Romina Dave is a 20 year old male seen by Registered Dietitian for Admission Nutrition Risk Screen for positive and RN Consult - \"continued weight loss, newer Crohn's dx (November)\".      PMH of: Crohn's with recent admit in 11/2020 d/t flare and concern for abscess.    Admit 2/2: Exacerbation of Crohn's, possible pelvic abscess.    NUTRITION HISTORY  - Information obtained from patient and chart.  No previous RD documentation from this system.  Denies outpatient RD contact or specific nutrition recommendations from his GI team.  Has only received handouts on low fiber diet when recently admitted.  - Diet at home: Avoids lactose (yogurt, milk, cheese) as it causes increase in GI sx (specifically cramping).  Has been on low fiber and bland diets in the past, though did not feel they really helped his sx.    - Usual intakes: When feeling well eats 2-3 large meals + small snacks daily - likes fast food, rice and chicken, beef and starches, applesauce, etc.  Reports he was eating normally 1 week ago given he was on steroids, but over the past week has not been able to tolerate much because of GI sx.  He made himself NPO for a few days, then mostly having liquids so that it \"doesn't hurt when it comes out\".  Suspect meeting <75% needs for period of months to maintain low BMI.  - Barriers to PO intakes: Fluctuating appetite (steroid dependent), abdominal pain.  - Stooling patterns: " "Reports he has 1 BM daily in the AM and does not have diarrhea/increased frequency of stooling with eating.  - Use of oral supplements: Tolerates and likes [place] Breakfast Essentials.  Mixes the powder into Lactaid milk.  - Allergies: NKFA.      CURRENT NUTRITION ORDERS  Diet Order:     Regular    Current Intake/Tolerance:  Advanced to above from regular and tolerating thus far.  Had part of a lunch meal.        NUTRITION FOCUSED PHYSICAL ASSESSMENT FOR DIAGNOSING MALNUTRITION)  Yes    Obtained from Chart/Interdisciplinary Team:  - CRS team consulted given abscess concerns   - GI team consulted, note indicates recently started on Remicade  - No documentation of PI  - Stooling patterns reviewed    ANTHROPOMETRICS  Height: 5' 11\"  Weight: 129 lbs 1.6 oz  Body mass index is 18.01 kg/m .  Weight Status:  Underweight BMI <18.5  Weight History:  Wt Readings from Last 10 Encounters:   03/05/21 58.6 kg (129 lb 1.6 oz)   02/08/21 59.9 kg (132 lb)   11/14/20 59.7 kg (131 lb 9.6 oz)     - Wt relatively stable since 11/2020.  Confirmed by patient.  Had reported to nursing staff a \"50 pound\" weight loss before being diagnosed but denies wt loss with this writer.  Reports he has never really been above 130-135#.    LABS  Labs reviewed:  Electrolytes  Potassium (mmol/L)   Date Value   03/06/2021 4.5   03/05/2021 3.4   02/09/2021 4.3    Blood Glucose  Glucose (mg/dL)   Date Value   03/06/2021 122 (H)   03/05/2021 145 (H)   02/09/2021 112 (H)   02/08/2021 82   11/16/2020 125 (H)    Inflammatory Markers  CRP Inflammation (mg/L)   Date Value   03/06/2021 76.9 (H)   02/08/2021 15.1 (H)   11/16/2020 73.8 (H)     WBC (10e9/L)   Date Value   03/06/2021 2.8 (L)   03/05/2021 5.3   02/09/2021 6.5     Albumin (g/dL)   Date Value   03/05/2021 2.9 (L)   11/14/2020 2.8 (L)      Sodium (mmol/L)   Date Value   03/06/2021 139   03/05/2021 139   02/09/2021 139    Renal  Urea Nitrogen (mg/dL)   Date Value   03/06/2021 9   03/05/2021 15 "   02/09/2021 10     Creatinine (mg/dL)   Date Value   03/06/2021 1.02   03/05/2021 1.03   02/09/2021 0.91     Additional  Ketones Urine (mg/dL)   Date Value   03/05/2021 10 (A)        B/P: 104/64, T: 97.9, P: 81, R: 16      MEDICATIONS  Medications reviewed:    famotidine  20 mg Intravenous Q12H     methylPREDNISolone  20 mg Intravenous Q12H     piperacillin-tazobactam  3.375 g Intravenous Q6H        sodium chloride 100 mL/hr at 03/06/21 0553          ASSESSED NUTRITION NEEDS PER APPROVED PRACTICE GUIDELINES:    Dosing Weight 59 kg   Estimated Energy Needs: 1588-6943+ kcals (30-35+ Kcal/Kg)  Justification: repletion  Estimated Protein Needs: >/=71 grams protein (>/=1.2 g pro/Kg)  Justification: Repletion and preservation of lean body mass  Estimated Fluid Needs: per MD      NUTRITION DIAGNOSIS:  Inadequate oral intake related to abdominal pain and fluctuating appetite with ongoing Crohn's flare as evidenced by meeting <75% needs for period of months, muscle loss, malnutrition coding.     NUTRITION INTERVENTIONS  Recommendations / Nutrition Prescription  Diet per MD.  Small/frequent meals as tolerated.  Ok for low fiber or bland if better tolerated, though patient knows how to self-select based on GI sx.  Continue avoiding lactose.  Protein focus as able.     Continue [place] Breakfast Essentials in the home setting (mixed into Lactaid milk).      Start daily MVI/M.      Implementation  Nutrition education: Provided education on small/frequent meals and the importance of protein.  Reviewed food sources for protein which he tolerates.  Encouraged consistent incorporation throughout the day and ongoing use of [place] Breakfast Essentials, especially if eating less than usual.      Medical Food Supplement: As above.     Multivitamin/Mineral: As above.     Collaboration and Referral of Nutrition care: Discussed POC with RN.    Nutrition Goals  Wt stable at 129# (+/- 2#) during timeframe.       MONITORING AND  EVALUATION:  Progress towards goals will be monitored and evaluated per protocol and Practice Guidelines          Paige Guzman RDN, LD  Clinical Dietitian  3rd floor/ICU: [phone]  All other floors: [phone]  Weekend/holiday: [phone]

## 2021-03-06 NOTE — PLAN OF CARE
Rated lower mid abdominal pain as a 1 to 7. Described it as sharp. Dilaudid given once with a decrease in pain. Denied nausea. No stool. Said he no longer has dysuria,urgency or penile pain. Tolerated 360 ml apple juice prior to midnight and has since been NPO.  IV infusion running at 100 ml/hr. Plan for AM labs. Colorectal and GI consults.

## 2021-03-06 NOTE — CONSULTS
[facility]- GASTROENTEROLOGY CONSULTATION      Romina Dave  [address] [facility] [address] [place] [id number]  20 year old male     Admission Date/Time: 3/5/2021  Primary Care Provider: No Ref-Primary, Physician     We were asked to see the patient in consultation by Marcos Bañuelos Md for evaluation of Crohn's disease and pelvic pain.    ASSESSMENT:    1. Crohn's ileitis with active disease just getting initiated on biological therapy with Remicade started January 4, 2021.  Skip lesions causing mild strictures in the terminal ileum are ongoing  2. Pelvic pain along with dysuria and pain with defecation found to have enhancing fluid collection possible early abscess.  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 Agree with antibiotics, can switch to oral tomorrow if stable to complete a 14-day course  2. IV steroids, can switch to oral prednisone tomorrow if stable  3. Checking Remicade level to see if level is quite low and we may to need to increase the dose to 10 mg/kg with infusions.  4. No need for surgery at this time.  5. Diet as tolerated    May be able to discharge today or tomorrow on oral therapy, to continue Remicade.  We will follow up on infliximab level which is a send out and will be back for about a week.    Enio Yeboah MD   Cell [phone]  After 5 PM, please call [phone]    ________________________________________________________________________        HPI:  Romina Dave is a 20 year old male who is a recent diagnosis of Crohn's ileitis found in November 2020, just got initiated on Remicade January 2021.  He has had abdominal pain, trouble eating, weight loss, but does respond to prednisone.  He has had his first 3 Remicade infusions and was getting a little better, able to wean off of prednisone but then was having increasing trouble eating and a new pelvic pain.    He rechecked at the clinic a few days ago he was prescribed prednisone again but had  not yet restarted it.  He came in because he thought he might have a UTI because every time he passed urine it was very painful and is suprapubic and left lower quadrant of his pelvis.  This was even painful when he passed gas or stool so he cut back on his eating.  He did have an appetite, no nausea or vomiting, no abdominal pain it was all really pelvic pain.    He came to the emergency department found to have a normal white count, no temperature but CT scan showed skip lesions of active Crohn's and a 1.8 cm rim-enhancing fluid collection in the left pelvis could be a small abscess.  They talk to him about possibly discharging home on oral antibiotics and thyroids but he ended up needing to be admitted.  He was started on IV antibiotics and IV steroids.    Today he says pain is much better currently has no pain is mainly when he has a bowel movement or urinates.  He is quite hungry.  He is afebrile.     ROS: A comprehensive ten point review of systems was negative aside from those in mentioned in the HPI.      PAST MEDICAL HISTORY:  No past medical history on file.  SOCIAL HISTORY:  Social History     Tobacco Use     Smoking status: Not on file   Substance Use Topics     Alcohol use: Not on file     Drug use: Not on file     FAMILY HISTORY:  No family history on file.  ALLERGIES: No Known Allergies  MEDICATIONS:   Prior to Admission medications    Medication Sig Start Date End Date Taking? Authorizing Provider   acetaminophen (TYLENOL) 500 MG tablet Take 500-1,000 mg by mouth every 6 hours as needed for mild pain   Yes Unknown, Entered By History   inFLIXimab (REMICADE IV) Every 8 weeks   Yes Unknown, Entered By History   Multiple Vitamins-Minerals (MULTIVITAMIN GUMMIES ADULT PO) Take 1 chew tab by mouth daily   Yes Unknown, Entered By History   predniSONE (DELTASONE) 10 MG tablet daily 40mg daily x7 days, 30mg daily x7 days, 20mg daily x7 days, 5mg daily x7 days.    Unknown, Entered By History       PHYSICAL  "EXAM:   /64   Pulse 81   Temp 97.9  F (36.6  C) (Oral)   Resp 16   Ht 1.803 m (5' 11\")   Wt 58.6 kg (129 lb 1.6 oz)   SpO2 98%   BMI 18.01 kg/m     GEN: Alert, oriented x3, No distress.  ANGELICA: Atraumatic, Oropharynx without erythema, exudate, or ulcers, No scleral icterus  NECK: Supple.    LYMPH: No LAD noted.  CV: RRR, no LE edema  LUNGS: Clear to auscultation bilaterally  ABD: +Bowel sounds, soft, mild suprapubic and left lower quadrant pelvic tenderness, non-distended, no rebound or guarding.  SKIN: No rash, No jaundice   NEURO: Alert and oriented      ADDITIONAL DATA:   I reviewed the patient's new clinical lab test results.   Recent Labs   Lab Test 03/06/21  0645 03/05/21  1538 02/09/21  0550   WBC 2.8* 5.3 6.5   HGB 14.0 13.7 13.2*   MCV 85 83 80    280 287     Recent Labs   Lab Test 03/06/21  0645 03/05/21  1538 02/09/21  0550    139 139   POTASSIUM 4.5 3.4 4.3   CHLORIDE 110* 109 110*   CO2 26 27 24   BUN 9 15 10   CR 1.02 1.03 0.91   ANIONGAP 3 3 5   LUCIANO 8.5 8.5 7.7*   * 145* 112*     Recent Labs   Lab Test 03/05/21  1538 03/05/21  1452 11/14/20  1046 11/14/20  1039   ALBUMIN 2.9*  --  2.8*  --    BILITOTAL 0.3  --  0.4  --    ALT 22  --  14  --    AST 12  --  12  --    PROTEIN  --  20*  --  20*   LIPASE 185  --  137  --         Imaging:  CT Abd/pelvis 3/5/21  IMPRESSION:   1.  There is prominent diffuse bowel wall thickening and mucosal  hyperenhancement involving approximately 20 cm of terminal ileum.  Findings are consistent with active Crohn's inflammation.  2.  Mild bowel wall thickening involving the sigmoid colon could be  related to lack of distention, although a region of Crohn's colitis  could also have this appearance.  3.  1.8 x 1.4 cm rim-enhancing area of fluid within the pelvis  posteriorly on the left could possibly represent a small abscess.  Clinical correlation and follow-up is recommended.                     "

## 2021-03-06 NOTE — H&P
[facility]  Hospitalist Admission Note  Name: Romina Dave    MRN: [id number]  YOB: 2000    Age: 20 year old  Date of admission: 3/5/2021  Primary care provider: No Ref-Primary, Physician            Assessment and Plan:   Romina Dave is a 20 year old male with known diagnosis of for Crohn's disease and recent hospitalization back in November for acute Crohn's flareup with suspicion of small intra-abdominal abscess and recurrent hospitalization last February for Crohn's disease exacerbation and has been recovering at home but still utilizing oral prednisone and has been having increasing abdominal cramping, discomfort, pain in the last 3 days.  He was in contact with his gastroenterology team and provided him with increased dosing of his prednisone but he was not getting relief of symptomatology in spite of using APAP round-the-clock.  He felt feverish 2 days prior to his presentation but denies any shortness of breath, vomiting, urinary symptomatology, mental status changes, sore throat, no anosmia, nor mental status changes.  Due to his increasing abdominal cramping and pain he was advised to seek medical attention in the emergency room.    1.  Acute exacerbation of chronic disease  2.  Possible pelvic abscess  3.  History of iron deficiency anemia    Admit as inpatient.  Close monitoring.  At risk for clinical deterioration.  Continuation of IV antibiotics.  Zosyn for now.  Received 40 mg of intravenous corticosteroids.  We will continue 20 mg IV every 12.  Will await for instructions and recommendations from GI service consultation.  Also requested colorectal service input regarding this findings of fluid collection might be an abscess collection.  Colorectal service evaluated and saw this patient on her last hospitalization back in November 2020  -Clear liquids for now, keep him n.p.o. after midnight until seen by colorectal and GI service  -Optimize pain control    Code status:  Full code  Admit to inpatient  Prophylaxis: Mechanical  Disposition: Home but likely will be requiring at least 2 inpatient hospitalization next          Chief Complaint:   Increasing abdominal pain       Source of Information:   Patient with good reliability  Discussion with ED physician  Review of E chart records         History of Present Illness:   Romina Dave is a 20 year old male with known diagnosis of for Crohn's disease and recent hospitalization back in November for acute Crohn's flareup with suspicion of small intra-abdominal abscess and recurrent hospitalization last February for Crohn's disease exacerbation and has been recovering at home but still utilizing oral prednisone and has been having increasing abdominal cramping, discomfort, pain in the last 3 days.  He was in contact with his gastroenterology team and provided him with increased dosing of his prednisone but he was not getting relief of symptomatology in spite of using APAP round-the-clock.  He felt feverish 2 days prior to his presentation but denies any shortness of breath, vomiting, urinary symptomatology, mental status changes, sore throat, no anosmia, nor mental status changes.  Due to his increasing abdominal cramping and pain he was advised to seek medical attention in the emergency room.  During his initial presentation he has stable hemodynamics, afebrile, not hypoxic and further investigation did reveal prominent diffuse wall thickening and mucosal hyperenhancement of the terminal ileum consistent.  Active Crohn's inflammation however there is also a consideration of pelvic fluid collection that might represent a small abscess.  His case was discussed.  Gastroenterology service and recommended corticosteroids, antibiotics and hospitalization and states referral to us.  During the time of my exam I found her patient laying comfortably in bed, remained hemodynamically stable still afebrile, very pleasant, interactive and endorses  "no other new complaints.            Past Medical History:   No past medical history on file.          Past Surgical History:   No past surgical history on file.          Social History:     Social History     Tobacco Use     Smoking status: Not on file   Substance Use Topics     Alcohol use: Not on file             Family History:   Family history was fully reviewed and non-contributory in this case.         Allergies:   No Known Allergies          Medications:     Prior to Admission medications    Medication Sig Last Dose Taking? Auth Provider   acetaminophen (TYLENOL) 500 MG tablet Take 500-1,000 mg by mouth every 6 hours as needed for mild pain 3/5/2021 at am Yes Unknown, Entered By History   inFLIXimab (REMICADE IV) Every 8 weeks 2/18/2021 Yes Unknown, Entered By History   Multiple Vitamins-Minerals (MULTIVITAMIN GUMMIES ADULT PO) Take 1 chew tab by mouth daily Past Week at Unknown time Yes Unknown, Entered By History   predniSONE (DELTASONE) 10 MG tablet daily 40mg daily x7 days, 30mg daily x7 days, 20mg daily x7 days, 5mg daily x7 days.   Unknown, Entered By History             Review of Systems:   A Comprehensive greater than 10 system review of systems was carried out.  Pertinent positives and negatives are noted above.  Otherwise negative for contributory information.           Physical Exam:   Blood pressure 103/58, pulse 80, temperature 97.9  F (36.6  C), temperature source Oral, resp. rate 12, height 1.803 m (5' 11\"), weight 58.6 kg (129 lb 1.6 oz), SpO2 100 %.  Wt Readings from Last 1 Encounters:   03/05/21 58.6 kg (129 lb 1.6 oz)     Exam:  GENERAL: No apparent distress. Awake, alert, and fully oriented.  HEENT: Normocephalic, atraumatic. Extraocular movements intact.  CARDIOVASCULAR: Regular rate and rhythm without murmurs or rubs. No JVD  PULMONARY: Clear to auscultation, no wheezes, crackles  ABDOMINAL: Soft, mild tenderness lower quadrant , non-distended. Bowel sounds normoactive. No " hepatosplenomegaly.  EXTREMITIES: No cyanosis or clubbing. No edema.  NEUROLOGICAL: CN 2-12 grossly intact, awake and alert x3, spontaneous and coherent speech. no focal neurological deficits.  DERMATOLOGICAL: No rash, ulcer, ecchymoses, jaundice.  Psych: not agitation, not combative, pleasant mood           Data:   EKG: No new EKG seen  Imaging:  Results for orders placed or performed during the hospital encounter of 03/05/21   Abdomen XR, 2 vw, flat and upright    Narrative    ABDOMEN TWO-THREE VIEW  3/5/2021 3:30 PM     HISTORY: Abdominal pain, history of Crohn's disease, SBO in February.    COMPARISON: None.    FINDINGS: Small amount of stool. No free air. There are no air filled  distended loops of small bowel. The colon is not distended. The lung  bases are unremarkable.      Impression    IMPRESSION: Nonobstructed bowel gas pattern.    LITTLE TOVAR MD   Abd/pelvis CT,  IV  contrast only TRAUMA / AAA    Narrative    CT ABDOMEN AND PELVIS WITH CONTRAST 3/5/2021 4:22 PM    CLINICAL HISTORY: Lower abdominal pain.    TECHNIQUE: CT scan of the abdomen and pelvis was performed following  injection of IV contrast. Multiplanar reformats were obtained. Dose  reduction techniques were used.    CONTRAST: 66mL Isovue-370    COMPARISON: CT of the abdomen and pelvis performed 2/8/2021.    FINDINGS:   LOWER CHEST: The visualized lung bases are clear.    HEPATOBILIARY: Unremarkable. No hepatic masses are seen.    PANCREAS: Normal.    SPLEEN: Normal.    ADRENAL GLANDS: Normal.    KIDNEYS/BLADDER: Unremarkable. No hydronephrosis.    BOWEL: Prominent diffuse bowel wall thickening and mucosal  hyperenhancement involving approximately 20 cm of terminal ileum is  consistent with active Crohn's inflammation. No associated bowel  obstruction is identified. There is mild bowel wall thickening  involving the sigmoid colon, which could be related to lack of  distention, although Crohn's colitis involving the sigmoid colon could  also  possibly have this appearance.    PELVIC ORGANS: Unremarkable.    LYMPH NODES: No enlarged lymph nodes are identified in the abdomen or  pelvis.    VASCULATURE: Unremarkable.    ADDITIONAL FINDINGS: There is a small rim-enhancing fluid collection  within the pelvis posteriorly on the left (series 3 image 166)  measuring 1.8 x 1.4 cm. No free fluid in the pelvis. No free  intraperitoneal air.    MUSCULOSKELETAL: Unremarkable.      Impression    IMPRESSION:   1.  There is prominent diffuse bowel wall thickening and mucosal  hyperenhancement involving approximately 20 cm of terminal ileum.  Findings are consistent with active Crohn's inflammation.  2.  Mild bowel wall thickening involving the sigmoid colon could be  related to lack of distention, although a region of Crohn's colitis  could also have this appearance.  3.  1.8 x 1.4 cm rim-enhancing area of fluid within the pelvis  posteriorly on the left could possibly represent a small abscess.  Clinical correlation and follow-up is recommended.    JHOANA CLEMONS MD       Labs:  Recent Labs   Lab 03/05/21  1452   CULT PENDING     Recent Labs   Lab 03/05/21  1538   WBC 5.3   HGB 13.7   HCT 44.3   MCV 83        Recent Labs   Lab 03/05/21  1538      POTASSIUM 3.4   CHLORIDE 109   CO2 27   ANIONGAP 3   *   BUN 15   CR 1.03   GFRESTIMATED >90   GFRESTBLACK >90   LUCIANO 8.5   PROTTOTAL 6.9   ALBUMIN 2.9*   BILITOTAL 0.3   ALKPHOS 72   AST 12   ALT 22     No results for input(s): DD in the last 168 hours.  No results for input(s): SED, CRP in the last 168 hours.  Recent Labs   Lab 03/05/21  1538   *     No results for input(s): INR in the last 168 hours.  Recent Labs   Lab 03/05/21  1538   LIPASE 185     No results for input(s): TROPONIN, TROPI, TROPR in the last 168 hours.    Invalid input(s): TROP, TROPONINIES  Recent Labs   Lab 03/05/21  1452   COLOR Yellow   APPEARANCE Clear   URINEGLC Negative   URINEBILI Negative   URINEKETONE 10*   SG 1.035    UBLD Negative   URINEPH 5.5   PROTEIN 20*   NITRITE Negative   LEUKEST Negative   RBCU 2   WBCU 1

## 2021-03-06 NOTE — PROGRESS NOTES
[facility]  Hospitalist Progress Note  Marcos Bañuelos MD, MD 03/06/2021  (Text Page)  Reason for Stay (Diagnosis): Crohn's flare, pelvic abscess         Assessment and Plan:      Summary of Stay: Romina Dave is a 20 year old male with known diagnosis of for Crohn's disease and recent hospitalization back in November for acute Crohn's flareup with suspicion of small intra-abdominal abscess and recurrent hospitalization last February for Crohn's disease exacerbation and has been recovering at home but still utilizing oral prednisone and has been having increasing abdominal cramping, discomfort, pain in the last 3 days.  He was in contact with his gastroenterology team and provided him with increased dosing of his prednisone but he was not getting relief of symptomatology in spite of using APAP round-the-clock.  He felt feverish 2 days prior to his presentation but denies any shortness of breath, vomiting, urinary symptomatology, mental status changes, sore throat, no anosmia, nor mental status changes.  Due to his increasing abdominal cramping and pain he was advised to seek medical attention in the emergency room.     1.  Acute exacerbation of chronic disease  2.  Possible pelvic abscess  3.  History of iron deficiency anemia  4.  At risk for malnutrition    Continue inpatient care.  Remain on corticosteroids.  Will await further recommendations from GI service regarding corticosteroids management with his Crohn's disease flareup.  Currently on IV Zosyn in the setting of fluid collection seen on the CT scan that might be secondary to underlying abscess.  Colorectal service formal evaluation also requested.  Dietitian, nutrition service input requested  Stop IV fluids as patient is tolerating oral diet.    DVT Prophylaxis: Pneumatic Compression Devices  Code Status: Full Code  Discharge Dispo: Home  Estimated Disch Date / # of Days until Disch: Likely in the next 24 to 36 hours        Interval  "History (Subjective):      Continuing care today.  Seen and examined.  Chart reviewed.  Case discussed with nursing service.  Remain afebrile overnight.  Feels much better in terms of earlier abdominal cramping.  Tolerating liquid diet with no recurrence of any abdominal symptoms such as pain, nausea or vomiting.  Denies any bleeding tendencies.  No mental status changes.  Stable hemodynamics overnight.  Not requiring any oxygen support.                Physical Exam:      Last Vital Signs:  /64   Pulse 81   Temp 97.9  F (36.6  C) (Oral)   Resp 16   Ht 1.803 m (5' 11\")   Wt 58.6 kg (129 lb 1.6 oz)   SpO2 98%   BMI 18.01 kg/m      I/O last 3 completed shifts:  In: 840 [P.O.:840]  Out: -   Wt Readings from Last 1 Encounters:   03/05/21 58.6 kg (129 lb 1.6 oz)     Vitals:    03/05/21 2015   Weight: 58.6 kg (129 lb 1.6 oz)       Constitutional: Awake, alert, cooperative, no apparent distress   Respiratory: Clear to auscultation bilaterally, no crackles or wheezing   Cardiovascular: Regular rate and rhythm, normal S1 and S2, and no murmur noted   Abdomen: Normal bowel sounds, soft, non-distended, non-tender   Skin: No rashes, no cyanosis, dry to touch   Neuro: Alert and oriented x3, no weakness, spontaneous and coherent speech   Extremities: No edema, normal range of motion   Other(s): Euthymic mood, not agitated       All other systems: Negative          Medications:      All current medications were reviewed with changes reflected in problem list.         Data:      All new lab and imaging data was reviewed.   Labs:  Recent Labs   Lab 03/05/21  1452   CULT Culture in progress     Recent Labs   Lab 03/06/21  0645 03/05/21  1538   WBC 2.8* 5.3   HGB 14.0 13.7   HCT 44.5 44.3   MCV 85 83    280     Recent Labs   Lab 03/06/21  0645 03/05/21  1538    139   POTASSIUM 4.5 3.4   CHLORIDE 110* 109   CO2 26 27   ANIONGAP 3 3   * 145*   BUN 9 15   CR 1.02 1.03   GFRESTIMATED >90 >90   GFRESTBLACK " >90 >90   LUCIANO 8.5 8.5   PROTTOTAL  --  6.9   ALBUMIN  --  2.9*   BILITOTAL  --  0.3   ALKPHOS  --  72   AST  --  12   ALT  --  22     Recent Labs   Lab 03/06/21  0645   SED 23*   CRP 76.9*     Recent Labs   Lab 03/06/21  0645 03/05/21  1538   * 145*     Recent Labs   Lab 03/05/21  1538   LIPASE 185     No results for input(s): TROPONIN, TROPI, TROPR in the last 168 hours.    Invalid input(s): TROP, TROPONINIES  No results for input(s): TSH in the last 168 hours.  Recent Labs   Lab 03/05/21  1452   COLOR Yellow   APPEARANCE Clear   URINEGLC Negative   URINEBILI Negative   URINEKETONE 10*   SG 1.035   UBLD Negative   URINEPH 5.5   PROTEIN 20*   NITRITE Negative   LEUKEST Negative   RBCU 2   WBCU 1      Imaging:   Results for orders placed or performed during the hospital encounter of 03/05/21   Abdomen XR, 2 vw, flat and upright    Narrative    ABDOMEN TWO-THREE VIEW  3/5/2021 3:30 PM     HISTORY: Abdominal pain, history of Crohn's disease, SBO in February.    COMPARISON: None.    FINDINGS: Small amount of stool. No free air. There are no air filled  distended loops of small bowel. The colon is not distended. The lung  bases are unremarkable.      Impression    IMPRESSION: Nonobstructed bowel gas pattern.    LITTLE TOVAR MD   Abd/pelvis CT,  IV  contrast only TRAUMA / AAA    Narrative    CT ABDOMEN AND PELVIS WITH CONTRAST 3/5/2021 4:22 PM    CLINICAL HISTORY: Lower abdominal pain.    TECHNIQUE: CT scan of the abdomen and pelvis was performed following  injection of IV contrast. Multiplanar reformats were obtained. Dose  reduction techniques were used.    CONTRAST: 66mL Isovue-370    COMPARISON: CT of the abdomen and pelvis performed 2/8/2021.    FINDINGS:   LOWER CHEST: The visualized lung bases are clear.    HEPATOBILIARY: Unremarkable. No hepatic masses are seen.    PANCREAS: Normal.    SPLEEN: Normal.    ADRENAL GLANDS: Normal.    KIDNEYS/BLADDER: Unremarkable. No hydronephrosis.    BOWEL: Prominent  diffuse bowel wall thickening and mucosal  hyperenhancement involving approximately 20 cm of terminal ileum is  consistent with active Crohn's inflammation. No associated bowel  obstruction is identified. There is mild bowel wall thickening  involving the sigmoid colon, which could be related to lack of  distention, although Crohn's colitis involving the sigmoid colon could  also possibly have this appearance.    PELVIC ORGANS: Unremarkable.    LYMPH NODES: No enlarged lymph nodes are identified in the abdomen or  pelvis.    VASCULATURE: Unremarkable.    ADDITIONAL FINDINGS: There is a small rim-enhancing fluid collection  within the pelvis posteriorly on the left (series 3 image 166)  measuring 1.8 x 1.4 cm. No free fluid in the pelvis. No free  intraperitoneal air.    MUSCULOSKELETAL: Unremarkable.      Impression    IMPRESSION:   1.  There is prominent diffuse bowel wall thickening and mucosal  hyperenhancement involving approximately 20 cm of terminal ileum.  Findings are consistent with active Crohn's inflammation.  2.  Mild bowel wall thickening involving the sigmoid colon could be  related to lack of distention, although a region of Crohn's colitis  could also have this appearance.  3.  1.8 x 1.4 cm rim-enhancing area of fluid within the pelvis  posteriorly on the left could possibly represent a small abscess.  Clinical correlation and follow-up is recommended.    JHOANA CLEMONS MD

## 2021-03-06 NOTE — PLAN OF CARE
Vital Signs: VSS. Afebrile.  Pain/Comfort: Denies pain  Assessment: WDL. Abdomen flat. Bowel sounds active.  Diet: NPO this morning. Tolerated clears, advanced to regular.  Output: Voiding  Activity/Ambulation: Up in room independently

## 2021-03-06 NOTE — CONSULTS
[facility]  Colon and Rectal Surgery Consult Note  Name: Romina Dave    MRN: [id number]  YOB: 2000    Age: 20 year old  Date of admission: 3/5/2021  Primary care provider: No Ref-Primary, Physician     Requesting Physician:  Dr. Bañuelos  Reason for consult:  Crohn's disease           History of Present Illness:   Romina Dave is a 20 year old male with a history of Crohn's disease. He comes in with pelvic pain and a small associated pelvic abscess.    Mr. Dave's diagnosis was made in November of 2020. He does think that he's had a number of years of symptoms prior to all this occurring though, and his best bet his disease activity started in 2015. At that time he noticed some bleeding per rectum as well as some perianal skin tags. He's been admitted to hospital three times with his Crohn's, inclusive of his initial visit and this one. This time his symptoms are no generalized abdominal pain and fullness, but instead more pelvic pain.     Prior to admission he was on PO steroids and Remicade. He's just gotten two doses of the remicade.    He did have a colonoscopy that was consistent with Crohn's. He has no family history of IBD that he knows of                Past Medical History:   No past medical history on file.          Past Surgical History:   No past surgical history on file.            Social History:     Social History     Tobacco Use     Smoking status: Not on file   Substance Use Topics     Alcohol use: Not on file             Family History:   No family history on file.          Allergies:   No Known Allergies          Medications:       famotidine  20 mg Intravenous Q12H     methylPREDNISolone  20 mg Intravenous Q12H     piperacillin-tazobactam  3.375 g Intravenous Q6H             Review of Systems:   A comprehensive greater than 10 system review of systems was carried out.  Pertinent positives and negatives are noted above.  Otherwise negative for contributory info.  "           Physical Exam:     Blood pressure 104/64, pulse 81, temperature 97.9  F (36.6  C), temperature source Oral, resp. rate 16, height 1.803 m (5' 11\"), weight 58.6 kg (129 lb 1.6 oz), SpO2 98 %.    Intake/Output Summary (Last 24 hours) at 3/6/2021 1418  Last data filed at 3/5/2021 2300  Gross per 24 hour   Intake 840 ml   Output --   Net 840 ml     Exam:  General - Awake alert and oriented, appears stated age  Pulm - Non-labored breathing with normal respiratory effort  CVS - reg rate and rhythm, no peripheral edema  Abd - Soft and NT. Terminal ileum palpable in RLQ.  No guarding, rigidity or peritoneal signs.   Rectal exam was performed. It revealed skin tags but no signs of an acute inflammatory process. He was uncomfortable during JUSTYN, but that was largely due to him clamping down prior to the exam.  Neuro - CN II-XII grossly intact  Musculoskeletal - extremities with no clubbing, cyanosis or edema; able to ambulate  Psych - responsive, alert, cooperative; oriented x3; appropriate mood and affect  External/skin - inspection reveals no rashes, lesions or ulcers, normal coloring             Data Reviewed:     Recent Results (from the past 24 hour(s))   Abdomen XR, 2 vw, flat and upright    Narrative    ABDOMEN TWO-THREE VIEW  3/5/2021 3:30 PM     HISTORY: Abdominal pain, history of Crohn's disease, SBO in February.    COMPARISON: None.    FINDINGS: Small amount of stool. No free air. There are no air filled  distended loops of small bowel. The colon is not distended. The lung  bases are unremarkable.      Impression    IMPRESSION: Nonobstructed bowel gas pattern.    LITTLE TOVAR MD   Abd/pelvis CT,  IV  contrast only TRAUMA / AAA    Narrative    CT ABDOMEN AND PELVIS WITH CONTRAST 3/5/2021 4:22 PM    CLINICAL HISTORY: Lower abdominal pain.    TECHNIQUE: CT scan of the abdomen and pelvis was performed following  injection of IV contrast. Multiplanar reformats were obtained. Dose  reduction techniques were " used.    CONTRAST: 66mL Isovue-370    COMPARISON: CT of the abdomen and pelvis performed 2/8/2021.    FINDINGS:   LOWER CHEST: The visualized lung bases are clear.    HEPATOBILIARY: Unremarkable. No hepatic masses are seen.    PANCREAS: Normal.    SPLEEN: Normal.    ADRENAL GLANDS: Normal.    KIDNEYS/BLADDER: Unremarkable. No hydronephrosis.    BOWEL: Prominent diffuse bowel wall thickening and mucosal  hyperenhancement involving approximately 20 cm of terminal ileum is  consistent with active Crohn's inflammation. No associated bowel  obstruction is identified. There is mild bowel wall thickening  involving the sigmoid colon, which could be related to lack of  distention, although Crohn's colitis involving the sigmoid colon could  also possibly have this appearance.    PELVIC ORGANS: Unremarkable.    LYMPH NODES: No enlarged lymph nodes are identified in the abdomen or  pelvis.    VASCULATURE: Unremarkable.    ADDITIONAL FINDINGS: There is a small rim-enhancing fluid collection  within the pelvis posteriorly on the left (series 3 image 166)  measuring 1.8 x 1.4 cm. No free fluid in the pelvis. No free  intraperitoneal air.    MUSCULOSKELETAL: Unremarkable.      Impression    IMPRESSION:   1.  There is prominent diffuse bowel wall thickening and mucosal  hyperenhancement involving approximately 20 cm of terminal ileum.  Findings are consistent with active Crohn's inflammation.  2.  Mild bowel wall thickening involving the sigmoid colon could be  related to lack of distention, although a region of Crohn's colitis  could also have this appearance.  3.  1.8 x 1.4 cm rim-enhancing area of fluid within the pelvis  posteriorly on the left could possibly represent a small abscess.  Clinical correlation and follow-up is recommended.    JHOANA CLEMONS MD       Recent Labs   Lab 03/06/21  0645 03/05/21  1538   WBC 2.8* 5.3   HGB 14.0 13.7   HCT 44.5 44.3   MCV 85 83    280          Lab Results   Component Value Date      03/06/2021     03/05/2021     02/09/2021    Lab Results   Component Value Date    CHLORIDE 110 03/06/2021    CHLORIDE 109 03/05/2021    CHLORIDE 110 02/09/2021    Lab Results   Component Value Date    BUN 9 03/06/2021    BUN 15 03/05/2021    BUN 10 02/09/2021      Lab Results   Component Value Date    POTASSIUM 4.5 03/06/2021    POTASSIUM 3.4 03/05/2021    POTASSIUM 4.3 02/09/2021    Lab Results   Component Value Date    CO2 26 03/06/2021    CO2 27 03/05/2021    CO2 24 02/09/2021    Lab Results   Component Value Date    CR 1.02 03/06/2021    CR 1.03 03/05/2021    CR 0.91 02/09/2021            Assessment and Plan:   Romina Dave is a 20 year old male who presented with Crohn's disease. He now has a small pelvic abscess and 20cm of thickened ileum. He's currently on steroids, pip-tazo, and remicade.     Moving forward, GI has suggested continuing his remicade and perhaps increasing the dose. His pelvic abscess is small, likely associated with his Crohn's and will hopefully resolve with antibiotics alone.     I did talk to Mr. Dave about the possibility of surgery in the future should he not respond to medical treatment. He is keen to avoid surgery, understandably, but appreciated having the conversation at this point rather than when surgery was imminent.    Plan:  1. Admitted to hospitalist  2. Surgery: No urgent indication at this time  3. Diet: LRD  4. Antibiotics:  Continue abx. May switch to oral when primary team feels appropriate    Will discuss with Dr. De La Cruz.      Gareth Becker MD PhD  Colorectal Surgery Fellow

## 2021-03-06 NOTE — PLAN OF CARE
"Vital signs: Stable; afebrile.   Pain Control: Pain goal: No pain; Patient experiencing intermittent \"sharp\" abdominal/groin pain. Patient reports pain is \"fast\" and refuses intervention at this time.   Diet: Clear liquids. Tolerating apple juice and water.  Output: Voiding. No BM.                Activity: Independent; moving frequently throughout room.   Updates: Continuous IV fluids initiated. PO potassium replacement given as ordered.   Plan: Continue IV antibiotics and IV steroids. Pain control as needed. Labs in AM. Colorectal and GI to see patient in AM.     Patient resting comfortably. Will continue to monitor and provide for cares.       "

## 2021-03-07 VITALS
HEART RATE: 73 BPM | TEMPERATURE: 98 F | BODY MASS INDEX: 18.07 KG/M2 | SYSTOLIC BLOOD PRESSURE: 110 MMHG | OXYGEN SATURATION: 99 % | RESPIRATION RATE: 16 BRPM | DIASTOLIC BLOOD PRESSURE: 66 MMHG | HEIGHT: 71 IN | WEIGHT: 129.1 LBS

## 2021-03-07 PROCEDURE — 99239 HOSP IP/OBS DSCHRG MGMT >30: CPT | Performed by: INTERNAL MEDICINE

## 2021-03-07 PROCEDURE — 999N001145 HC STATISTIC INFLIXIMAB: Performed by: INTERNAL MEDICINE

## 2021-03-07 PROCEDURE — 250N000011 HC RX IP 250 OP 636: Performed by: INTERNAL MEDICINE

## 2021-03-07 PROCEDURE — 250N000009 HC RX 250: Performed by: INTERNAL MEDICINE

## 2021-03-07 PROCEDURE — 36415 COLL VENOUS BLD VENIPUNCTURE: CPT | Performed by: INTERNAL MEDICINE

## 2021-03-07 RX ADMIN — METHYLPREDNISOLONE SODIUM SUCCINATE 20 MG: 40 INJECTION, POWDER, FOR SOLUTION INTRAMUSCULAR; INTRAVENOUS at 06:55

## 2021-03-07 RX ADMIN — TAZOBACTAM SODIUM AND PIPERACILLIN SODIUM 3.38 G: 375; 3 INJECTION, SOLUTION INTRAVENOUS at 06:55

## 2021-03-07 RX ADMIN — FAMOTIDINE 20 MG: 10 INJECTION, SOLUTION INTRAVENOUS at 09:11

## 2021-03-07 ASSESSMENT — ACTIVITIES OF DAILY LIVING (ADL): DEPENDENT_IADLS:: INDEPENDENT

## 2021-03-07 NOTE — PROGRESS NOTES
"[facility] - GASTROENTEROLOGY PROGRESS NOTE     SUBJECTIVE:  No pain at rest.  Just a brief twinge of pain with urination, no bowel movements.  Eating well.  No fevers.        OBJECTIVE:  BP 96/61   Pulse 58   Temp 97.7  F (36.5  C) (Oral)   Resp 16   Ht 1.803 m (5' 11\")   Wt 58.6 kg (129 lb 1.6 oz)   SpO2 98%   BMI 18.01 kg/m    Temp (24hrs), Av.9  F (36.6  C), Min:97.7  F (36.5  C), Max:98.1  F (36.7  C)    Patient Vitals for the past 72 hrs:   Weight   21 58.6 kg (129 lb 1.6 oz)        PHYSICAL EXAM  GEN: Alert, no distress  ABD: Soft, non-tender    Additional Data:  I have reviewed the patient's new clinical lab results:   Recent Labs   Lab Test 21  0645 21  1538 21  0550   WBC 2.8* 5.3 6.5   HGB 14.0 13.7 13.2*   MCV 85 83 80    280 287     Recent Labs   Lab Test 21  0645 21  1538 21  0550    139 139   POTASSIUM 4.5 3.4 4.3   CHLORIDE 110* 109 110*   CO2 26 27 24   BUN 9 15 10   CR 1.02 1.03 0.91   ANIONGAP 3 3 5   LUCIANO 8.5 8.5 7.7*   * 145* 112*     Recent Labs   Lab Test 21  1538 21  1452 20  1046 20  1039   ALBUMIN 2.9*  --  2.8*  --    BILITOTAL 0.3  --  0.4  --    ALT 22  --  14  --    AST 12  --  12  --    PROTEIN  --  20*  --  20*   LIPASE 185  --  137  --         IMPRESSION/Plan:  1. 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   a. OK to discharge to complete 14 days of antibiotics - 12 more days of augmentin  b. Already has prednisone taper prescribed and has pill bottle with him: 40mg x 7 d, 30mg x 7d, 20mg x 7d, 10mg x 7 day, then stop.   c. infliximab level is pending.   It's not a trough level, but may need to increase infliximab dose with next infusion.     OK to discharge with f/u at [facility].       Enio Yeboah  Cell [phone]  After 5 PM, please call [phone]      "

## 2021-03-07 NOTE — DISCHARGE SUMMARY
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       Discharge Disposition:  Discharged to home     Allergies:  No Known Allergies     Discharge Medications:   Current Discharge Medication List      START taking these medications    Details   amoxicillin-clavulanate (AUGMENTIN) 875-125 MG tablet Take 1 tablet by mouth 2 times daily for 12 days  Qty: 24 tablet, Refills: 0    Associated Diagnoses: Crohn's disease of small intestine with complication (H)         CONTINUE these medications which have NOT CHANGED    Details   acetaminophen (TYLENOL) 500 MG tablet Take 500-1,000 mg by mouth every 6 hours as needed for mild pain      inFLIXimab (REMICADE IV) Every 8 weeks      Multiple Vitamins-Minerals (MULTIVITAMIN GUMMIES ADULT PO) Take 1 chew tab by mouth daily      predniSONE (DELTASONE) 10 MG tablet daily 40mg daily x7 days, 30mg daily x7 days, 20mg daily x7 days, 5mg daily x7 days.              Condition on Discharge:  Discharge condition: Stable   Discharge vitals: Blood pressure 110/66, pulse 73, temperature 98  F (36.7  C), temperature source Oral, resp. rate 16, height 1.803 m (5' 11\"), weight 58.6 kg (129 lb 1.6 oz), SpO2 99 %.   Code status on discharge: Full Code     History of Present Illness:  See detailed admission note for full details.        Significant Physical Exam Findings Day of Discharge:  HEENT; Atraumatic, normocephalic, pinkish conjuctiva, pupils " bilateral reactive   Skin: warm and moist, no rashes    Lungs: equal chest expansion, clear to auscultation, no wheezes, no stridor, no crackles,   Heart: normal rate, normal rhythm, no rubs or gallops.   Abdomen: normal bowel sounds, no tenderness, no peritoneal signs, no guarding  Extremities: no deformities, no edema   Neuro; follow commands, alert and oriented x3, spontaneous speech, coherent, moves all extremities spontaneously  Psych; no hallucination, euthymic mood, not agitated        Procedures other than Imaging:  none     Imaging:  Results for orders placed or performed during the hospital encounter of 03/05/21   Abdomen XR, 2 vw, flat and upright    Narrative    ABDOMEN TWO-THREE VIEW  3/5/2021 3:30 PM     HISTORY: Abdominal pain, history of Crohn's disease, SBO in February.    COMPARISON: None.    FINDINGS: Small amount of stool. No free air. There are no air filled  distended loops of small bowel. The colon is not distended. The lung  bases are unremarkable.      Impression    IMPRESSION: Nonobstructed bowel gas pattern.    LITTLE TOVAR MD   Abd/pelvis CT,  IV  contrast only TRAUMA / AAA    Narrative    CT ABDOMEN AND PELVIS WITH CONTRAST 3/5/2021 4:22 PM    CLINICAL HISTORY: Lower abdominal pain.    TECHNIQUE: CT scan of the abdomen and pelvis was performed following  injection of IV contrast. Multiplanar reformats were obtained. Dose  reduction techniques were used.    CONTRAST: 66mL Isovue-370    COMPARISON: CT of the abdomen and pelvis performed 2/8/2021.    FINDINGS:   LOWER CHEST: The visualized lung bases are clear.    HEPATOBILIARY: Unremarkable. No hepatic masses are seen.    PANCREAS: Normal.    SPLEEN: Normal.    ADRENAL GLANDS: Normal.    KIDNEYS/BLADDER: Unremarkable. No hydronephrosis.    BOWEL: Prominent diffuse bowel wall thickening and mucosal  hyperenhancement involving approximately 20 cm of terminal ileum is  consistent with active Crohn's inflammation. No associated  bowel  obstruction is identified. There is mild bowel wall thickening  involving the sigmoid colon, which could be related to lack of  distention, although Crohn's colitis involving the sigmoid colon could  also possibly have this appearance.    PELVIC ORGANS: Unremarkable.    LYMPH NODES: No enlarged lymph nodes are identified in the abdomen or  pelvis.    VASCULATURE: Unremarkable.    ADDITIONAL FINDINGS: There is a small rim-enhancing fluid collection  within the pelvis posteriorly on the left (series 3 image 166)  measuring 1.8 x 1.4 cm. No free fluid in the pelvis. No free  intraperitoneal air.    MUSCULOSKELETAL: Unremarkable.      Impression    IMPRESSION:   1.  There is prominent diffuse bowel wall thickening and mucosal  hyperenhancement involving approximately 20 cm of terminal ileum.  Findings are consistent with active Crohn's inflammation.  2.  Mild bowel wall thickening involving the sigmoid colon could be  related to lack of distention, although a region of Crohn's colitis  could also have this appearance.  3.  1.8 x 1.4 cm rim-enhancing area of fluid within the pelvis  posteriorly on the left could possibly represent a small abscess.  Clinical correlation and follow-up is recommended.    JHOANA CLEMONS MD        Consultations:  Consultation during this admission received from gastroenterology and colorectal service.     Recent Lab Results:  Recent Labs   Lab 03/06/21  0645 03/05/21  1538   WBC 2.8* 5.3   HGB 14.0 13.7   HCT 44.5 44.3   MCV 85 83    280     Recent Labs   Lab 03/05/21  1452   CULT <10,000 colonies/mL  urogenital roscoe  Susceptibility testing not routinely done       Recent Labs   Lab 03/06/21  0645 03/05/21  1538    139   POTASSIUM 4.5 3.4   CHLORIDE 110* 109   CO2 26 27   ANIONGAP 3 3   * 145*   BUN 9 15   CR 1.02 1.03   GFRESTIMATED >90 >90   GFRESTBLACK >90 >90   LUCIANO 8.5 8.5   PROTTOTAL  --  6.9   ALBUMIN  --  2.9*   BILITOTAL  --  0.3   ALKPHOS  --  72   AST   --  12   ALT  --  22     Recent Labs   Lab 03/06/21  0645 03/05/21  1538   * 145*     No results for input(s): LACT in the last 168 hours.  No results for input(s): TROPONIN, TROPI, TROPR in the last 168 hours.    Invalid input(s): TROP, TROPONINIES  Recent Labs   Lab 03/05/21  1452   COLOR Yellow   APPEARANCE Clear   URINEGLC Negative   URINEBILI Negative   URINEKETONE 10*   SG 1.035   UBLD Negative   URINEPH 5.5   PROTEIN 20*   NITRITE Negative   LEUKEST Negative   RBCU 2   WBCU 1          Pending Results:    Unresulted Labs Ordered in the Past 30 Days of this Admission     Date and Time Order Name Status Description    3/7/2021 0000 Infliximab level In process            Discharge Instructions and Follow-Up:   Discharge diet: Orders Placed This Encounter      Regular Diet Adult      Diet     Discharge activity: Activity as tolerated   Discharge follow-up: 1-2 weeks with PCP  GI service as scheduled   Outpatient therapy: None    Other instructions:  Continuation of your prednisone therapy as directed by GI and continuation of your antibiotics to finish course     Hospital Course:  This is a case of a very pleasant 21-year-old gentleman who has been diagnosed with Crohn's disease and came in and presented here in the emergency room for increasing abdominal cramping, nausea and vomiting.  Found with Crohn's colitis with consideration of early pelvic abscess likely from chronic penetrating disease.  No surgical intervention needed.  Continued on intravenous corticosteroids and broad-spectrum IV antibiotics.  He responded well and currently remained hemodynamically stable, tolerating oral intake with no recurrence of any abdominal symptomatology.  He is optimized from GI perspective.  Wanting to be discharged in the hospital.  No bleeding tendencies.  No diarrhea, no nausea or vomiting.  Remain afebrile.  Will proceed with home discharge today and continuation of oral antibiotics as recommended by GI service to  finish 12-day course of Augmentin and no new prescriptions for days corticosteroids management as he already has tapering prednisone dosing at home.     Total time spent in face to face contact with the patient and coordinating discharge was:  > 30 Minutes.

## 2021-03-07 NOTE — PLAN OF CARE
Vital Signs: VSS.  Afebrile.  Pain/Comfort: Denies pain  Assessment: WDL. Active bowel sounds  Diet: Tolerating regular diet  Output: [place]  Activity/Ambulation: Up in room independently  Plan: Discharge instructions given. Home meds given and explained.

## 2021-03-07 NOTE — PROGRESS NOTES
"[id number]  Pt calm and cooperative.VSS, Lungs clear. Bowel sounds normoactive, reports spots of blood when wiping but no active bleeding/ bloody BM. Good appetite with small frequent meals. VSS  /58   Pulse 71   Temp 98  F (36.7  C) (Oral)   Resp 16   Ht 1.803 m (5' 11\")   Wt 58.6 kg (129 lb 1.6 oz)   SpO2 97%   BMI 18.01 kg/m      "

## 2021-03-07 NOTE — PLAN OF CARE
RN cares [id number]  Pt is stable receiving IV medications: pepcid, solu-medrol, Zosyn for Chron's Flare.  Pt denies pain or nausea.  Bowel sounds are active and audible x4.  He reports last BM this AM, reported as normal.  He is tolerating regular diet this afternoon.  States, that he is taking advice of nutritionist to have more smaller, frequent meals.  Up in room independently.  States he has no questions or concerns when asked. Might discharge home tomorrow on oral medications and and increase in dose for remicaide infusions.

## 2021-03-07 NOTE — CONSULTS
Care Management Initial Consult    General Information  Assessment completed with: Patient,    Type of CM/SW Visit: Initial Assessment    Primary Care Provider verified and updated as needed: Yes(met because he does not have PCP)   Readmission within the last 30 days: previous discharge plan unsuccessful   Return Category: Exacerbation of disease  Reason for Consult: care coordination/care conference, discharge planning  Advance Care Planning: Advance Care Planning Reviewed: no concerns identified          Communication Assessment  Patient's communication style: spoken language (English or Bilingual)    Hearing Difficulty or Deaf: no   Wear Glasses or Blind: no    Cognitive  Cognitive/Neuro/Behavioral: WDL                      Living Environment:   People in home: friend(s)     Current living Arrangements: apartment      Able to return to prior arrangements: yes       Family/Social Support:  Care provided by: self  Provides care for: no one  Marital Status: Single  Grandparent(s)          Description of Support System: Supportive, Involved    Support Assessment: Adequate family and caregiver support    Current Resources:   Patient receiving home care services: No     Community Resources: None  Equipment currently used at home: none  Supplies currently used at home: None    Employment/Financial:  Employment Status: employed full-time        Financial Concerns: No concerns identified           Lifestyle & Psychosocial Needs:        Socioeconomic History     Marital status: Single     Spouse name: Not on file     Number of children: Not on file     Years of education: Not on file     Highest education level: Not on file          Functional Status:  Prior to admission patient needed assistance:   Dependent ADLs:: Independent  Dependent IADLs:: Independent       Mental Health Status:      No concerns identified    Chemical Dependency Status:      No concerns identified          Values/Beliefs:  Spiritual, Cultural  Beliefs, Zoroastrian Practices, Values that affect care: no               Additional Information:  CTS met with pt for discharge planning.  Pt admitted with pelvic abscess. He was here in November 2020 and diagnosed with Chron's, at that time he did not have insurance.  He has now has [employer] insurance. Educated pt on the importance of establishing care with PCP.  Since it is Sunday, I am unable to arrange PCP apt.  Provided him resources for PCP and his health plan phone number.  Educated pt that he should call his health plan Monday morning to see which PCP are in his network.  Pt agreed to call on Monday. Grandparents will transport pt home.  Lilliam Deleon RN, BSN, PHN, CTS  Care Coordinator  [facility]  [phone]

## 2021-03-07 NOTE — PLAN OF CARE
Denies pain when asked. Abdomen is flat and non tender when touched.  Tolerated sips of water.Passing gas. No further stool. Slept between cares. Encourage ambulation. Medicate for pain as needed. Monitor bowel function. Zosyn and Solumedrol as ordered.

## 2021-03-10 LAB — LAB SCANNED RESULT: NORMAL

## 2021-04-01 ENCOUNTER — OFFICE VISIT (OUTPATIENT)
Dept: FAMILY MEDICINE | Facility: CLINIC | Age: 21
End: 2021-04-01
Payer: COMMERCIAL

## 2021-04-01 VITALS
WEIGHT: 133 LBS | BODY MASS INDEX: 18.62 KG/M2 | HEIGHT: 71 IN | OXYGEN SATURATION: 99 % | DIASTOLIC BLOOD PRESSURE: 78 MMHG | HEART RATE: 98 BPM | SYSTOLIC BLOOD PRESSURE: 116 MMHG | RESPIRATION RATE: 18 BRPM | TEMPERATURE: 97.7 F

## 2021-04-01 DIAGNOSIS — Z76.89 ENCOUNTER TO ESTABLISH CARE: ICD-10-CM

## 2021-04-01 DIAGNOSIS — R10.84 ABDOMINAL PAIN, GENERALIZED: ICD-10-CM

## 2021-04-01 DIAGNOSIS — K50.814 CROHN'S DISEASE OF BOTH SMALL AND LARGE INTESTINE WITH ABSCESS (H): Primary | ICD-10-CM

## 2021-04-01 PROCEDURE — 99203 OFFICE O/P NEW LOW 30 MIN: CPT | Performed by: PHYSICIAN ASSISTANT

## 2021-04-01 SDOH — HEALTH STABILITY: MENTAL HEALTH: HOW OFTEN DO YOU HAVE 6 OR MORE DRINKS ON ONE OCCASION?: NOT ASKED

## 2021-04-01 SDOH — HEALTH STABILITY: MENTAL HEALTH: HOW MANY STANDARD DRINKS CONTAINING ALCOHOL DO YOU HAVE ON A TYPICAL DAY?: NOT ASKED

## 2021-04-01 SDOH — HEALTH STABILITY: MENTAL HEALTH: HOW OFTEN DO YOU HAVE A DRINK CONTAINING ALCOHOL?: NOT ASKED

## 2021-04-01 ASSESSMENT — MIFFLIN-ST. JEOR: SCORE: 1625.41

## 2021-04-01 NOTE — PROGRESS NOTES
Assessment & Plan     Encounter to establish care    Abdominal pain, generalized  I suspect that his pain is related to recent Crohns flare.  He was recently approved for higher dose of Remicade and will start this in 2 weeks.  He has good follow up with GI and is not experiencing new or acute symptoms today. Advised that he reach out to his GI doctor regarding whether to increase his prednisone dose for a short time due to his persistent pain.  He is agreeable.     Crohn's disease of both small and large intestine with abscess (H)  See above      Return in about 6 months (around 10/1/2021) for Physical Exam.    Rashawn Valderrama PA-C  [facility] InstituteHOWARD Vanegas is a 20 year old who presents for the following health issues     HPI     Concern - Establish care and talk about issues with Crohn's disease      Romina is a 19 y/o male patient who was recently diagnosed with Crohns disease  11/2021.  He is following with [facility] and is currently having Remicade infusion every 8 weeks.  Recently was approved to increase dose but has not yet started a higher dose.   He has been hospitalized with Crohns related pain 3x over the past several months, the most recent was 3/5/2021.  He has been persistently experiencing diffuse abdominal pain, and he is unsure if this represents ain from his Crohns or some this else.  He is noticing pain now in his pelvis that is worse with urination and defecation.  He is not longer having diarrhea or hematochezia.  No vomiting or fevers.Currently on a prednisone taper, 10 mg daily.  Most recent CT scan results as follows:       IMPRESSION:   1.  There is prominent diffuse bowel wall thickening and mucosal  hyperenhancement involving approximately 20 cm of terminal ileum.  Findings are consistent with active Crohn's inflammation.  2.  Mild bowel wall thickening involving the sigmoid colon could be  related to lack of distention, although a region of  "Crohn's colitis  could also have this appearance.  3.  1.8 x 1.4 cm rim-enhancing area of fluid within the pelvis  posteriorly on the left could possibly represent a small abscess.  Clinical correlation and follow-up is recommended.         Review of Systems   Constitutional, HEENT, cardiovascular, pulmonary, GI, , musculoskeletal, neuro, skin, endocrine and psych systems are negative, except as otherwise noted.      Objective    /78   Pulse 98   Temp 97.7  F (36.5  C) (Tympanic)   Resp 18   Ht 1.803 m (5' 11\")   Wt 60.3 kg (133 lb)   SpO2 99%   BMI 18.55 kg/m    Body mass index is 18.55 kg/m .  Physical Exam   GENERAL: healthy, alert and no distress  RESP: lungs clear to auscultation - no rales, rhonchi or wheezes  CV: regular rate and rhythm, normal S1 S2, no S3 or S4, no murmur, click or rub  ABDOMEN: soft, mild diffuse TTP in all 4 quadrants, no rebound or guarding, no hepatosplenomegaly, no masses and bowel sounds normal  RECTAL (male): normal sphincter tone, no rectal masses, prostate normal size, smooth, nontender without nodules or masses            "

## 2021-04-05 ENCOUNTER — TELEPHONE (OUTPATIENT)
Dept: FAMILY MEDICINE | Facility: CLINIC | Age: 21
End: 2021-04-05

## 2021-04-14 ENCOUNTER — MYC MEDICAL ADVICE (OUTPATIENT)
Dept: FAMILY MEDICINE | Facility: CLINIC | Age: 21
End: 2021-04-14

## 2021-04-14 ENCOUNTER — NURSE TRIAGE (OUTPATIENT)
Dept: FAMILY MEDICINE | Facility: CLINIC | Age: 21
End: 2021-04-14

## 2021-04-14 NOTE — TELEPHONE ENCOUNTER
"Called patient. States he feels this is a crohns flare up. He rates pain as 8/10 and states it has been occurring about every 30-45 minutes, which is an increase from what it used to be.     RN advised patient to be evaluated in ED this evening given severity of his symptoms. No further action needed.    Additional Information    Negative: Passed out (i.e., fainted, collapsed and was not responding)    Negative: Shock suspected (e.g., cold/pale/clammy skin, too weak to stand, low BP, rapid pulse)    Negative: Sounds like a life-threatening emergency to the triager    Negative: Chest pain    Negative: Pain is mainly in upper abdomen (if needed ask: 'is it mainly above the belly button?')    Answer Assessment - Initial Assessment Questions  1. LOCATION: \"Where does it hurt?\"     Abdominal pain  -belly button area   -dx with chrons disease       - feels like this is a flare up, haven't figured out trigger foods yet     Was eating nachos on Monday, might have been too much cheese    2. RADIATION: \"Does the pain shoot anywhere else?\" (e.g., chest, back)     No        3. ONSET: \"When did the pain begin?\" (Minutes, hours or days ago)     Started on Tuesday 4/13        4. SUDDEN: \"Gradual or sudden onset?\"    Gradual         5. PATTERN \"Does the pain come and go, or is it constant?\"     - If constant: \"Is it getting better, staying the same, or worsening?\"       (Note: Constant means the pain never goes away completely; most serious pain is constant and it progresses)      - If intermittent: \"How long does it last?\" \"Do you have pain now?\"      (Note: Intermittent means the pain goes away completely between bouts)    Comes and goes       Was taking prednisone, has the pain 1x every 6 hours, now every 30-45 minutes      6. SEVERITY: \"How bad is the pain?\"  (e.g., Scale 1-10; mild, moderate, or severe)     - MILD (1-3): doesn't interfere with normal activities, abdomen soft and not tender to touch      - MODERATE (4-7): " "interferes with normal activities or awakens from sleep, tender to touch      - SEVERE (8-10): excruciating pain, doubled over, unable to do any normal activities        Rates as 8/10       7. RECURRENT SYMPTOM: \"Have you ever had this type of abdominal pain before?\" If so, ask: \"When was the last time?\" and \"What happened that time?\"     Yes        8. CAUSE: \"What do you think is causing the abdominal pain?\"    Chrons flare up         9. RELIEVING/AGGRAVATING FACTORS: \"What makes it better or worse?\" (e.g., movement, antacids, bowel movement)    Nachos made it worse  Eating makes it worse      Has not eaten in over 12 hours    -normally in hospital not allowed to eat for 24 hours     10. OTHER SYMPTOMS: \"Has there been any vomiting, diarrhea, constipation, or urine problems?\"     Having diarrhea today  - 2x    Protocols used: ABDOMINAL PAIN - MALE-A-OH      "

## 2021-04-24 ENCOUNTER — IMMUNIZATION (OUTPATIENT)
Dept: NURSING | Facility: CLINIC | Age: 21
End: 2021-04-24
Payer: COMMERCIAL

## 2021-04-24 PROCEDURE — 91301 PR COVID VAC MODERNA 100 MCG/0.5 ML IM: CPT

## 2021-04-24 PROCEDURE — 0011A PR COVID VAC MODERNA 100 MCG/0.5 ML IM: CPT

## 2021-05-01 ENCOUNTER — HEALTH MAINTENANCE LETTER (OUTPATIENT)
Age: 21
End: 2021-05-01

## 2021-05-10 ENCOUNTER — MYC MEDICAL ADVICE (OUTPATIENT)
Dept: FAMILY MEDICINE | Facility: CLINIC | Age: 21
End: 2021-05-10

## 2021-05-13 ENCOUNTER — OFFICE VISIT (OUTPATIENT)
Dept: FAMILY MEDICINE | Facility: CLINIC | Age: 21
End: 2021-05-13
Payer: COMMERCIAL

## 2021-05-13 VITALS
BODY MASS INDEX: 19.46 KG/M2 | TEMPERATURE: 97.7 F | HEART RATE: 88 BPM | SYSTOLIC BLOOD PRESSURE: 118 MMHG | OXYGEN SATURATION: 100 % | WEIGHT: 139 LBS | RESPIRATION RATE: 12 BRPM | HEIGHT: 71 IN | DIASTOLIC BLOOD PRESSURE: 78 MMHG

## 2021-05-13 DIAGNOSIS — K50.019 CROHN'S DISEASE OF SMALL INTESTINE WITH COMPLICATION (H): Primary | ICD-10-CM

## 2021-05-13 PROCEDURE — 99213 OFFICE O/P EST LOW 20 MIN: CPT | Performed by: PHYSICIAN ASSISTANT

## 2021-05-13 ASSESSMENT — MIFFLIN-ST. JEOR: SCORE: 1652.63

## 2021-05-13 ASSESSMENT — PAIN SCALES - GENERAL: PAINLEVEL: MILD PAIN (2)

## 2021-05-13 NOTE — PROGRESS NOTES
"    Assessment & Plan     Crohn's disease of small intestine with complication (H)  RAJANI is reasonable given his disease burden and frequent hospitalization.  He did not bring FMLA PPW with his today.  I wrote a note for him today for work to begin 3 month medical leave.  Advised that he reach out to his HR department for proper paperwork.  He also needs to follow up as scheduled with GI to begin his mercaptopurine      Return in about 4 weeks (around 6/10/2021) for follow up.    Rashawn Valderrama PA-C  [facility]    Gómez Vanegas is a 20 year old who presents for the following health issues     HPI       Romina is a 21 y/o male patient with recent diagnosis of Crohn's colitis for which he is following with [facility]. He is currently on Remicade infusions and will be starting Mercaptopurine in the next 1-2 weeks. He has had frequent flares requiring hospitalization.  Overall, he rates his abdominal pain 3/10 on a regular day.  He had spoken with his GI doctor about a RAJANI from work due to his frequent flares in order to rest and get his disease under better control. He is  Currently working full time but hs needed to take unexpected time from work for illness and does not want to lose his job.          Review of Systems   Constitutional, HEENT, cardiovascular, pulmonary, gi and gu systems are negative, except as otherwise noted.      Objective    /78   Pulse 88   Temp 97.7  F (36.5  C) (Tympanic)   Resp 12   Ht 1.803 m (5' 11\")   Wt 63 kg (139 lb)   SpO2 100%   BMI 19.39 kg/m    Body mass index is 19.39 kg/m .  Physical Exam   GENERAL: healthy, alert and no distress  PSYCH: mentation appears normal, affect normal/bright              "

## 2021-05-13 NOTE — LETTER
[facility]          [address] [place] [address]                            [phone]  Fax: [phone]    Romina Dave  [address] [address]    [id number]    May 13, 2021      To whom it may concern   Romina Dave was seen in my office today for illness.  He has been dealing with a chronic illness and will need to take a three month leave of absense to better manage this condition. His leave will begin Monday May 17, 2021  and will be active until August 30, 2021. Please help him accommodate this request.  Necessary paperwork can be sent to my office or to his specialist office.   If you have any other questions or concerns please feel free to contact me at anytime.        Sincerely,      Rashawn Valderrama PA-C

## 2021-05-20 ENCOUNTER — TRANSFERRED RECORDS (OUTPATIENT)
Dept: HEALTH INFORMATION MANAGEMENT | Facility: CLINIC | Age: 21
End: 2021-05-20

## 2021-05-22 ENCOUNTER — IMMUNIZATION (OUTPATIENT)
Dept: NURSING | Facility: CLINIC | Age: 21
End: 2021-05-22
Attending: INTERNAL MEDICINE
Payer: COMMERCIAL

## 2021-05-22 PROCEDURE — 91301 PR COVID VAC MODERNA 100 MCG/0.5 ML IM: CPT

## 2021-05-22 PROCEDURE — 0012A PR COVID VAC MODERNA 100 MCG/0.5 ML IM: CPT

## 2021-05-27 ENCOUNTER — TRANSFERRED RECORDS (OUTPATIENT)
Dept: HEALTH INFORMATION MANAGEMENT | Facility: CLINIC | Age: 21
End: 2021-05-27

## 2021-06-11 ENCOUNTER — TRANSFERRED RECORDS (OUTPATIENT)
Dept: HEALTH INFORMATION MANAGEMENT | Facility: CLINIC | Age: 21
End: 2021-06-11

## 2021-06-11 LAB
ALT SERPL-CCNC: 32 U/L (ref 0–78)
AST SERPL-CCNC: 22 U/L (ref 0–37)

## 2021-08-03 ENCOUNTER — TRANSFERRED RECORDS (OUTPATIENT)
Dept: HEALTH INFORMATION MANAGEMENT | Facility: CLINIC | Age: 21
End: 2021-08-03

## 2021-09-27 ENCOUNTER — TRANSFERRED RECORDS (OUTPATIENT)
Dept: HEALTH INFORMATION MANAGEMENT | Facility: CLINIC | Age: 21
End: 2021-09-27

## 2021-10-11 ENCOUNTER — HEALTH MAINTENANCE LETTER (OUTPATIENT)
Age: 21
End: 2021-10-11

## 2021-10-12 ENCOUNTER — TRANSFERRED RECORDS (OUTPATIENT)
Dept: HEALTH INFORMATION MANAGEMENT | Facility: CLINIC | Age: 21
End: 2021-10-12
Payer: COMMERCIAL

## 2021-12-10 ENCOUNTER — TRANSFERRED RECORDS (OUTPATIENT)
Dept: HEALTH INFORMATION MANAGEMENT | Facility: CLINIC | Age: 21
End: 2021-12-10
Payer: COMMERCIAL

## 2022-01-06 ENCOUNTER — TRANSFERRED RECORDS (OUTPATIENT)
Dept: HEALTH INFORMATION MANAGEMENT | Facility: CLINIC | Age: 22
End: 2022-01-06
Payer: COMMERCIAL

## 2022-01-06 LAB
ALT SERPL-CCNC: 22 IU/L (ref 0–44)
AST SERPL-CCNC: 26 IU/L (ref 0–40)

## 2022-03-03 ENCOUNTER — TRANSFERRED RECORDS (OUTPATIENT)
Dept: HEALTH INFORMATION MANAGEMENT | Facility: CLINIC | Age: 22
End: 2022-03-03
Payer: COMMERCIAL

## 2022-03-15 ENCOUNTER — TRANSFERRED RECORDS (OUTPATIENT)
Dept: HEALTH INFORMATION MANAGEMENT | Facility: CLINIC | Age: 22
End: 2022-03-15
Payer: COMMERCIAL

## 2022-04-15 ENCOUNTER — TRANSFERRED RECORDS (OUTPATIENT)
Dept: HEALTH INFORMATION MANAGEMENT | Facility: CLINIC | Age: 22
End: 2022-04-15
Payer: COMMERCIAL

## 2022-04-15 LAB
ALT SERPL-CCNC: 27 IU/L (ref 0–44)
AST SERPL-CCNC: 23 U/L (ref 0–40)

## 2022-04-21 ENCOUNTER — TRANSFERRED RECORDS (OUTPATIENT)
Dept: HEALTH INFORMATION MANAGEMENT | Facility: CLINIC | Age: 22
End: 2022-04-21
Payer: COMMERCIAL

## 2022-05-22 ENCOUNTER — HEALTH MAINTENANCE LETTER (OUTPATIENT)
Age: 22
End: 2022-05-22

## 2022-05-27 ENCOUNTER — TRANSFERRED RECORDS (OUTPATIENT)
Dept: HEALTH INFORMATION MANAGEMENT | Facility: CLINIC | Age: 22
End: 2022-05-27
Payer: COMMERCIAL

## 2022-08-03 ENCOUNTER — TRANSFERRED RECORDS (OUTPATIENT)
Dept: HEALTH INFORMATION MANAGEMENT | Facility: CLINIC | Age: 22
End: 2022-08-03

## 2022-08-03 LAB
ALT SERPL-CCNC: 26 IU/L (ref 0–44)
AST SERPL-CCNC: 22 IU/L (ref 0–40)

## 2022-08-23 ENCOUNTER — OFFICE VISIT (OUTPATIENT)
Dept: FAMILY MEDICINE | Facility: CLINIC | Age: 22
End: 2022-08-23
Payer: COMMERCIAL

## 2022-08-23 VITALS
OXYGEN SATURATION: 97 % | WEIGHT: 141.6 LBS | DIASTOLIC BLOOD PRESSURE: 78 MMHG | HEART RATE: 114 BPM | RESPIRATION RATE: 16 BRPM | HEIGHT: 69 IN | TEMPERATURE: 97.5 F | SYSTOLIC BLOOD PRESSURE: 114 MMHG | BODY MASS INDEX: 20.97 KG/M2

## 2022-08-23 DIAGNOSIS — Z11.59 NEED FOR HEPATITIS C SCREENING TEST: ICD-10-CM

## 2022-08-23 DIAGNOSIS — Z00.00 ROUTINE GENERAL MEDICAL EXAMINATION AT A HEALTH CARE FACILITY: Primary | ICD-10-CM

## 2022-08-23 DIAGNOSIS — L30.9 ECZEMA, UNSPECIFIED TYPE: ICD-10-CM

## 2022-08-23 DIAGNOSIS — Z11.4 SCREENING FOR HIV (HUMAN IMMUNODEFICIENCY VIRUS): ICD-10-CM

## 2022-08-23 PROCEDURE — 90472 IMMUNIZATION ADMIN EACH ADD: CPT | Performed by: FAMILY MEDICINE

## 2022-08-23 PROCEDURE — 90651 9VHPV VACCINE 2/3 DOSE IM: CPT | Performed by: FAMILY MEDICINE

## 2022-08-23 PROCEDURE — 99395 PREV VISIT EST AGE 18-39: CPT | Mod: 25 | Performed by: FAMILY MEDICINE

## 2022-08-23 PROCEDURE — 90715 TDAP VACCINE 7 YRS/> IM: CPT | Performed by: FAMILY MEDICINE

## 2022-08-23 PROCEDURE — 90471 IMMUNIZATION ADMIN: CPT | Performed by: FAMILY MEDICINE

## 2022-08-23 RX ORDER — TRIAMCINOLONE ACETONIDE 0.25 MG/G
OINTMENT TOPICAL 2 TIMES DAILY
Qty: 15 G | Refills: 0 | Status: SHIPPED | OUTPATIENT
Start: 2022-08-23

## 2022-08-23 ASSESSMENT — ENCOUNTER SYMPTOMS
COUGH: 0
NAUSEA: 0
CONSTIPATION: 0
WEAKNESS: 0
FREQUENCY: 0
HEARTBURN: 0
DIZZINESS: 0
ARTHRALGIAS: 0
DYSURIA: 0
PALPITATIONS: 0
ABDOMINAL PAIN: 0
CHILLS: 0
SHORTNESS OF BREATH: 0
EYE PAIN: 0
SORE THROAT: 0
FEVER: 0
JOINT SWELLING: 0
HEMATURIA: 0
NERVOUS/ANXIOUS: 0
DIARRHEA: 0
PARESTHESIAS: 0
HEMATOCHEZIA: 0
HEADACHES: 0
MYALGIAS: 0

## 2022-08-23 ASSESSMENT — PAIN SCALES - GENERAL: PAINLEVEL: NO PAIN (0)

## 2022-08-23 NOTE — PROGRESS NOTES
SUBJECTIVE:   CC: Romina Dave is an 22 year old male who presents for preventative health visit.       Patient has been advised of split billing requirements and indicates understanding: Yes  Healthy Habits:     Getting at least 3 servings of Calcium per day:  Yes    Bi-annual eye exam:  NO    Dental care twice a year:  NO    Sleep apnea or symptoms of sleep apnea:  None    Diet:  Regular (no restrictions) and Gluten-free/reduced    Frequency of exercise:  2-3 days/week    Duration of exercise:  15-30 minutes    Taking medications regularly:  Not Applicable    Medication side effects:  Not applicable    PHQ-2 Total Score: 2    Additional concerns today:  Yes          PROBLEMS TO ADD ON...    Has hx of skin rash on and off and gets irritated and dry skin patches  , worse in winter . Feeling well otherwise     Today's PHQ-2 Score:   PHQ-2 ( 1999 [employer]) 8/23/2022   Q1: Little interest or pleasure in doing things 1   Q2: Feeling down, depressed or hopeless 1   PHQ-2 Score 2   PHQ-2 Total Score (12-17 Years)- Positive if 3 or more points; Administer PHQ-A if positive -   Q1: Little interest or pleasure in doing things Several days   Q2: Feeling down, depressed or hopeless Several days   PHQ-2 Score 2       Abuse: Current or Past(Physical, Sexual or Emotional)- No  Do you feel safe in your environment? Yes    Have you ever done Advance Care Planning? (For example, a Health Directive, POLST, or a discussion with a medical provider or your loved ones about your wishes): No, advance care planning information given to patient to review.  Patient declined advance care planning discussion at this time.    Social History     Tobacco Use     Smoking status: Never Smoker     Smokeless tobacco: Never Used     Tobacco comment: One time   Substance Use Topics     Alcohol use: Not Currently     Comment: Every now and then     If you drink alcohol do you typically have >3 drinks per day or >7 drinks per week? No    Alcohol Use  8/23/2022   Prescreen: >3 drinks/day or >7 drinks/week? No       Last PSA: No results found for: PSA    Reviewed orders with patient. Reviewed health maintenance and updated orders accordingly - Yes  Patient Active Problem List   Diagnosis     Ileocolitis     Iron deficiency anemia, unspecified iron deficiency anemia type     Crohn's disease of small intestine with complication (H)     Crohn's disease of both small and large intestine with abscess (H)     No past surgical history on file.    Social History     Tobacco Use     Smoking status: Never Smoker     Smokeless tobacco: Never Used     Tobacco comment: One time   Substance Use Topics     Alcohol use: Not Currently     Comment: Every now and then     Family History   Problem Relation Age of Onset     Diabetes Maternal Grandfather      Coronary Artery Disease No family hx of      Hyperlipidemia No family hx of      Colon Cancer No family hx of            Reviewed and updated as needed this visit by clinical staff                    Reviewed and updated as needed this visit by Provider                   No past medical history on file.     ROS   CONSTITUTIONAL: NEGATIVE for fever, chills, change in weight  INTEGUMENTARY/SKIN: NEGATIVE for worrisome  moles or lesions except skin rash on and off and dry skin   EYES: NEGATIVE for vision changes or irritation  ENT: NEGATIVE for ear, mouth and throat problems  RESP: NEGATIVE for significant cough or SOB  BREAST: NEGATIVE for masses, tenderness or discharge  CV: NEGATIVE for chest pain, palpitations or peripheral edema  GI: NEGATIVE for nausea, abdominal pain, heartburn, or change in bowel habits, has hx of colitis  And has seen GI , but doing well    male: negative for dysuria, hematuria, decreased urinary stream, erectile dysfunction, urethral discharge  MUSCULOSKELETAL: NEGATIVE for significant arthralgias or myalgia  NEURO: NEGATIVE for weakness, dizziness or paresthesias  ENDOCRINE: NEGATIVE for temperature  intolerance, skin/hair changes  HEME/ALLERGY/IMMUNE: NEGATIVE for bleeding problems  PSYCHIATRIC: NEGATIVE for changes in mood or affect    OBJECTIVE:   There were no vitals taken for this visit.    Physical Exam  GENERAL: healthy, alert and no distress  EYES: Eyes grossly normal to inspection, PERRL and conjunctivae and sclerae normal  HENT: ear canals and TM's normal, nose and mouth without ulcers or lesions  NECK: no adenopathy, no asymmetry, masses, or scars and thyroid normal to palpation  RESP: lungs clear to auscultation - no rales, rhonchi or wheezes  CV: regular rate and rhythm, normal S1 S2, no S3 or S4, no murmur, click or rub, no peripheral edema   ABDOMEN: soft, nontender, no hepatosplenomegaly, no masses and bowel sounds normal   (male): normal male genital , normal testicle, no hernia, normal penis and no urethral discharge   MS: no gross musculoskeletal defects noted, no edema  SKIN: no suspicious lesions or moles and rash except has rough dry skin patchy areas on legs arms   NEURO: Normal strength and tone, mentation intact and speech normal  PSYCH: mentation appears normal, affect normal/bright        ASSESSMENT/PLAN:   (Z00.00) Routine general medical examination at a health care facility  (primary encounter diagnosis)  Comment:   Plan: Lipid panel reflex to direct LDL Fasting,Glucose,      (Z11.4) Screening for HIV (human immunodeficiency virus)  Comment:  Plan: HIV Antigen Antibody Combo      (Z11.59) Need for hepatitis C screening test  Comment:   Plan: Hepatitis C Screen Reflex to HCV RNA Quant and         Genotype          (L30.9) Eczema, unspecified type  Comment: Also had dry skin    Plan:  triamcinolone (KENALOG) 0.025 %         external ointment          Check labs. Script  Sent. Skin cares and  treatment discussed. follow up if problem   Patient expressed understanding and agreement with treatment plan. All patient's questions were answered, will let me know if has more  "later.  Medications: Rx's: Reviewed the potential side effects/complications of medications prescribed.       COUNSELING:   Reviewed preventive health counseling, as reflected in patient instructions       Regular exercise       Healthy diet/nutrition       Vision screening       Hearing screening       Immunizations    Vaccinated for: TDAP and Declined: COVID booster  due to Other will consider later                Alcohol Use        Family planning       Safe sex practices/STD prevention       Consider Hep C screening for all patients one time for ages 18-79 years       HIV screeninx in teen years, 1x in adult years, and at intervals if high risk    Estimated body mass index is 19.39 kg/m  as calculated from the following:    Height as of 21: 1.803 m (5' 11\").    Weight as of 21: 63 kg (139 lb).         He reports that he has never smoked. He has never used smokeless tobacco.      Counseling Resources:  ATP IV Guidelines  Pooled Cohorts Equation Calculator  FRAX Risk Assessment  ICSI Preventive Guidelines  Dietary Guidelines for Americans,   USDA's MyPlate  ASA Prophylaxis  Lung CA Screening    Felicia Garcia MD  [facility]  "

## 2022-08-23 NOTE — PATIENT INSTRUCTIONS
Preventive Health Recommendations  Male Ages 21 - 25     Yearly exam:             See your health care provider every year in order to  o   Review health changes.   o   Discuss preventive care.    o   Review your medicines if your doctor has prescribed any.  You should be tested each year for STDs (sexually transmitted diseases).   Talk to your provider about cholesterol testing.    If you are at risk for diabetes, you should have a diabetes test (fasting glucose).    Shots: Get a flu shot each year. Get a tetanus shot every 10 years.     Nutrition:  Eat at least 5 servings of fruits and vegetables daily.   Eat whole-grain bread, whole-wheat pasta and brown rice instead of white grains and rice.   Get adequate calcium and Vitamin D.     Lifestyle  Exercise for at least 150 minutes a week (30 minutes a day, 5 days a week). This will help you control your weight and prevent disease.   Limit alcohol to one drink per day.   No smoking.   Wear sunscreen to prevent skin cancer.   See your dentist every six months for an exam and cleaning.       Proper skin care from [facility]:     -Eliminate harsh soaps as they strip the natural oils from the skin, often resulting in dry itchy skin ( i.e. Dial, Zest, Thai Spring)  -Use mild soaps such as Cetaphil or Dove Sensitive Skin in the shower. You do not need to use soap on arms, legs, and trunk every time you shower unless visibly soiled.   -Avoid hot or cold showers.  -After showering, lightly dry off and apply moisturizing within 2-3 minutes. This will help trap moisture in the skin.   -Aggressive use of a moisturizer at least 1-2 times a day to the entire body (including -Vanicream, Cetaphil, Aquaphor or Cerave) and moisturize hands after every washing.  -We recommend using moisturizers that come in a tub that needs to be scooped out, not a pump. This has more of an oil base. It will hold moisture in your skin much better than a water base moisturizer. The above  recommended are non-pore clogging.

## 2022-09-24 ENCOUNTER — HEALTH MAINTENANCE LETTER (OUTPATIENT)
Age: 22
End: 2022-09-24

## 2023-07-24 ENCOUNTER — PATIENT OUTREACH (OUTPATIENT)
Dept: CARE COORDINATION | Facility: CLINIC | Age: 23
End: 2023-07-24
Payer: COMMERCIAL

## 2023-08-07 ENCOUNTER — PATIENT OUTREACH (OUTPATIENT)
Dept: CARE COORDINATION | Facility: CLINIC | Age: 23
End: 2023-08-07
Payer: COMMERCIAL

## 2023-10-14 ENCOUNTER — HEALTH MAINTENANCE LETTER (OUTPATIENT)
Age: 23
End: 2023-10-14

## 2024-12-01 ENCOUNTER — HEALTH MAINTENANCE LETTER (OUTPATIENT)
Age: 24
End: 2024-12-01